# Patient Record
Sex: MALE | Race: WHITE | NOT HISPANIC OR LATINO | Employment: FULL TIME | URBAN - METROPOLITAN AREA
[De-identification: names, ages, dates, MRNs, and addresses within clinical notes are randomized per-mention and may not be internally consistent; named-entity substitution may affect disease eponyms.]

---

## 2020-11-12 ENCOUNTER — OFFICE VISIT (OUTPATIENT)
Dept: URGENT CARE | Facility: CLINIC | Age: 55
End: 2020-11-12
Payer: COMMERCIAL

## 2020-11-12 VITALS
TEMPERATURE: 97.3 F | OXYGEN SATURATION: 94 % | DIASTOLIC BLOOD PRESSURE: 78 MMHG | HEIGHT: 72 IN | SYSTOLIC BLOOD PRESSURE: 130 MMHG | HEART RATE: 86 BPM | WEIGHT: 315 LBS | BODY MASS INDEX: 42.66 KG/M2 | RESPIRATION RATE: 14 BRPM

## 2020-11-12 DIAGNOSIS — B34.9 VIRAL ILLNESS: ICD-10-CM

## 2020-11-12 DIAGNOSIS — R50.9 FEVER, UNSPECIFIED FEVER CAUSE: ICD-10-CM

## 2020-11-12 DIAGNOSIS — R05.9 COUGH: ICD-10-CM

## 2020-11-12 DIAGNOSIS — J02.9 SORE THROAT: ICD-10-CM

## 2020-11-12 DIAGNOSIS — J30.1 SEASONAL ALLERGIC RHINITIS DUE TO POLLEN: ICD-10-CM

## 2020-11-12 PROCEDURE — 99203 OFFICE O/P NEW LOW 30 MIN: CPT | Performed by: NURSE PRACTITIONER

## 2020-11-12 ASSESSMENT — ENCOUNTER SYMPTOMS
NAUSEA: 0
COUGH: 1
ABDOMINAL PAIN: 0
FEVER: 0
ORTHOPNEA: 0
HEADACHES: 0
WHEEZING: 0
SORE THROAT: 0
MYALGIAS: 0
CHILLS: 0
WEAKNESS: 0
WEIGHT LOSS: 0
VOMITING: 0
SHORTNESS OF BREATH: 0
HEARTBURN: 0
RHINORRHEA: 1

## 2020-11-12 NOTE — PROGRESS NOTES
"Subjective:   Joni Stone is a 55 y.o. male who presents for Cough (runny nose, cough x 7-8 days, fever last night )       Cough  This is a new problem. The current episode started in the past 7 days. The problem has been gradually improving. The problem occurs every few hours. The cough is non-productive. Associated symptoms include postnasal drip and rhinorrhea (clear). Pertinent negatives include no chest pain, chills, ear pain, fever, headaches, heartburn, myalgias, sore throat, shortness of breath, weight loss or wheezing. Nothing aggravates the symptoms. Risk factors for lung disease include occupational exposure. He has tried nothing for the symptoms. His past medical history is significant for environmental allergies.     Pt presents for evaluation of a new problem, reports having a \"simple head cold \"over the past 7 days.  Attributed to seasonal allergies as well as working on a new project with a lot of dust.  Last night had a fever of 102 orally per his wife.  Patient states mother is old and unsure if it works appropriately.  States that he \"feels fine\" and is here for good measure.    Review of Systems   Constitutional: Negative for chills, fever, malaise/fatigue and weight loss.   HENT: Positive for postnasal drip and rhinorrhea (clear). Negative for congestion, ear pain and sore throat.    Respiratory: Positive for cough. Negative for shortness of breath and wheezing.    Cardiovascular: Negative for chest pain, orthopnea and leg swelling.   Gastrointestinal: Negative for abdominal pain, heartburn, nausea and vomiting.   Genitourinary: Negative for dysuria.   Musculoskeletal: Negative for myalgias.   Neurological: Negative for weakness and headaches.   Endo/Heme/Allergies: Positive for environmental allergies.   All other systems reviewed and are negative.      MEDS:   Current Outpatient Medications:   •  Ibuprofen (IBU PO), Take  by mouth., Disp: , Rfl:   ALLERGIES: No Known " Allergies    Patient's PMH, SocHx, SurgHx, FamHx, Drug allergies and medications were reviewed.     Objective:   /78 (BP Location: Left arm, Patient Position: Sitting, BP Cuff Size: Large adult)   Pulse 86   Temp 36.3 °C (97.3 °F) (Temporal)   Resp 14   Ht 1.829 m (6')   Wt (!) 168.3 kg (371 lb)   SpO2 94%   BMI 50.32 kg/m²     Physical Exam  Vitals signs and nursing note reviewed.   Constitutional:       General: He is awake.      Appearance: Normal appearance. He is well-developed.   HENT:      Head: Normocephalic and atraumatic.      Right Ear: Tympanic membrane, ear canal and external ear normal.      Left Ear: Tympanic membrane, ear canal and external ear normal.      Nose: Nose normal.      Mouth/Throat:      Mouth: Mucous membranes are moist.      Pharynx: Oropharynx is clear.   Eyes:      Extraocular Movements: Extraocular movements intact.      Conjunctiva/sclera: Conjunctivae normal.      Pupils: Pupils are equal, round, and reactive to light.   Neck:      Musculoskeletal: Full passive range of motion without pain, normal range of motion and neck supple.      Thyroid: No thyromegaly.      Trachea: Trachea normal.   Cardiovascular:      Rate and Rhythm: Normal rate and regular rhythm.      Pulses: Normal pulses.      Heart sounds: Normal heart sounds, S1 normal and S2 normal.   Pulmonary:      Effort: Pulmonary effort is normal. No respiratory distress.      Breath sounds: Normal breath sounds. No wheezing, rhonchi or rales.   Abdominal:      General: Bowel sounds are normal.      Palpations: Abdomen is soft.   Musculoskeletal: Normal range of motion.   Lymphadenopathy:      Cervical: No cervical adenopathy.   Skin:     General: Skin is warm and dry.      Capillary Refill: Capillary refill takes less than 2 seconds.   Neurological:      General: No focal deficit present.      Mental Status: He is alert and oriented to person, place, and time.      Gait: Gait is intact.   Psychiatric:          Attention and Perception: Attention and perception normal.         Mood and Affect: Mood normal.         Speech: Speech normal.         Behavior: Behavior normal. Behavior is cooperative.         Thought Content: Thought content normal.         Judgment: Judgment normal.         Assessment/Plan:   Assessment    1. Viral illness    2. Seasonal allergic rhinitis due to pollen    3. Sore throat  - COVID/SARS COV-2 PCR; Future    4. Cough  - COVID/SARS COV-2 PCR; Future    5. Fever, unspecified fever cause  - COVID/SARS COV-2 PCR; Future    Reviewed today's physical exam findings.  Currently does not meet criteria for Covid testing.  Advised patient to get new thermometer to evaluate possibility of fever.  Should his symptoms continue or he have a repeat fever, then advised him to do Covid testing through the drive-through site, lab order placed in chart.  Supportive care options discussed and reviewed.  Discussed the use of OTC medications as per package directions on a PRN basis for symptomatic relief due to his history of environmental allergies to pollen as well as dust at a current worksite.  Differential diagnosis, natural history, and indications for immediate follow-up were discussed.     Return to urgent care clinic or PCP if current symptoms do not improve and/or worsening symptoms occur. Advised of signs and symptoms which would warrant further evaluation and /or emergent evaluation in ED.  All questions answered and the patient agrees to the plan of care.       Please note that this dictation was created using voice recognition software. I have made every reasonable attempt to correct obvious errors, but I expect that there may be errors of grammar and possibly content that I did not discover before finalizing the note.

## 2020-11-13 ENCOUNTER — HOSPITAL ENCOUNTER (OUTPATIENT)
Dept: LAB | Facility: MEDICAL CENTER | Age: 55
End: 2020-11-13
Attending: NURSE PRACTITIONER
Payer: COMMERCIAL

## 2020-11-13 LAB — COVID ORDER STATUS COVID19: NORMAL

## 2020-11-13 PROCEDURE — U0003 INFECTIOUS AGENT DETECTION BY NUCLEIC ACID (DNA OR RNA); SEVERE ACUTE RESPIRATORY SYNDROME CORONAVIRUS 2 (SARS-COV-2) (CORONAVIRUS DISEASE [COVID-19]), AMPLIFIED PROBE TECHNIQUE, MAKING USE OF HIGH THROUGHPUT TECHNOLOGIES AS DESCRIBED BY CMS-2020-01-R: HCPCS

## 2020-11-13 PROCEDURE — C9803 HOPD COVID-19 SPEC COLLECT: HCPCS

## 2020-11-14 LAB
SARS-COV-2 RNA RESP QL NAA+PROBE: DETECTED
SPECIMEN SOURCE: ABNORMAL

## 2021-03-15 DIAGNOSIS — Z23 NEED FOR VACCINATION: ICD-10-CM

## 2021-03-24 ENCOUNTER — IMMUNIZATION (OUTPATIENT)
Dept: FAMILY PLANNING/WOMEN'S HEALTH CLINIC | Facility: IMMUNIZATION CENTER | Age: 56
End: 2021-03-24
Attending: INTERNAL MEDICINE
Payer: COMMERCIAL

## 2021-03-24 DIAGNOSIS — Z23 ENCOUNTER FOR VACCINATION: Primary | ICD-10-CM

## 2021-03-24 DIAGNOSIS — Z23 NEED FOR VACCINATION: ICD-10-CM

## 2021-03-24 PROCEDURE — 91300 PFIZER SARS-COV-2 VACCINE: CPT

## 2021-03-24 PROCEDURE — 0001A PFIZER SARS-COV-2 VACCINE: CPT

## 2021-04-15 ENCOUNTER — IMMUNIZATION (OUTPATIENT)
Dept: FAMILY PLANNING/WOMEN'S HEALTH CLINIC | Facility: IMMUNIZATION CENTER | Age: 56
End: 2021-04-15
Attending: INTERNAL MEDICINE
Payer: COMMERCIAL

## 2021-04-15 DIAGNOSIS — Z23 ENCOUNTER FOR VACCINATION: Primary | ICD-10-CM

## 2021-04-15 PROCEDURE — 0002A PFIZER SARS-COV-2 VACCINE: CPT

## 2021-04-15 PROCEDURE — 91300 PFIZER SARS-COV-2 VACCINE: CPT

## 2021-06-18 ENCOUNTER — HOSPITAL ENCOUNTER (OUTPATIENT)
Dept: RADIOLOGY | Facility: MEDICAL CENTER | Age: 56
End: 2021-06-18
Attending: NURSE PRACTITIONER
Payer: COMMERCIAL

## 2021-06-18 ENCOUNTER — OFFICE VISIT (OUTPATIENT)
Dept: URGENT CARE | Facility: CLINIC | Age: 56
End: 2021-06-18
Payer: COMMERCIAL

## 2021-06-18 VITALS
BODY MASS INDEX: 42.66 KG/M2 | RESPIRATION RATE: 18 BRPM | HEIGHT: 72 IN | DIASTOLIC BLOOD PRESSURE: 82 MMHG | HEART RATE: 110 BPM | OXYGEN SATURATION: 95 % | TEMPERATURE: 97.6 F | SYSTOLIC BLOOD PRESSURE: 128 MMHG | WEIGHT: 315 LBS

## 2021-06-18 DIAGNOSIS — M79.89 PAIN AND SWELLING OF RIGHT LOWER LEG: ICD-10-CM

## 2021-06-18 DIAGNOSIS — S81.801A OPEN WOUND OF RIGHT LOWER LEG, INITIAL ENCOUNTER: ICD-10-CM

## 2021-06-18 DIAGNOSIS — M79.661 PAIN AND SWELLING OF RIGHT LOWER LEG: ICD-10-CM

## 2021-06-18 DIAGNOSIS — L03.115 CELLULITIS OF RIGHT LOWER EXTREMITY: ICD-10-CM

## 2021-06-18 PROCEDURE — 90715 TDAP VACCINE 7 YRS/> IM: CPT | Performed by: NURSE PRACTITIONER

## 2021-06-18 PROCEDURE — 99214 OFFICE O/P EST MOD 30 MIN: CPT | Mod: 25 | Performed by: NURSE PRACTITIONER

## 2021-06-18 PROCEDURE — 90471 IMMUNIZATION ADMIN: CPT | Performed by: NURSE PRACTITIONER

## 2021-06-18 PROCEDURE — 93971 EXTREMITY STUDY: CPT | Mod: RT

## 2021-06-18 PROCEDURE — 96372 THER/PROPH/DIAG INJ SC/IM: CPT | Performed by: NURSE PRACTITIONER

## 2021-06-18 RX ORDER — AMOXICILLIN AND CLAVULANATE POTASSIUM 875; 125 MG/1; MG/1
1 TABLET, FILM COATED ORAL 2 TIMES DAILY
Qty: 14 TABLET | Refills: 0 | Status: ON HOLD
Start: 2021-06-18 | End: 2021-06-22

## 2021-06-18 RX ORDER — DOXYCYCLINE HYCLATE 100 MG
100 TABLET ORAL 2 TIMES DAILY
Qty: 14 TABLET | Refills: 0 | Status: ON HOLD
Start: 2021-06-18 | End: 2021-06-22

## 2021-06-18 ASSESSMENT — ENCOUNTER SYMPTOMS
DIARRHEA: 1
CARDIOVASCULAR NEGATIVE: 1
COUGH: 0
SHORTNESS OF BREATH: 0
VOMITING: 0
CHILLS: 0
FEVER: 1
ABDOMINAL PAIN: 0
RESPIRATORY NEGATIVE: 1
ROS SKIN COMMENTS: RIGHT LOWER LEG WOUND
NAUSEA: 1

## 2021-06-18 ASSESSMENT — VISUAL ACUITY: OU: 1

## 2021-06-18 NOTE — PROGRESS NOTES
Subjective:     Joni Stone is a 56 y.o. male who presents for Leg Problem (leg sore on right leg x 6 months , swelling and discoloration and drainage x 4 days  ) and Nausea (nausea and fevers x 2 days )       Other  This is a new problem. The problem has been gradually worsening. Associated symptoms include a fever and nausea. Pertinent negatives include no abdominal pain, chills, coughing or vomiting.     Patient reports that 2 days ago, he started to experience some fevers and nausea.  Appetite decreased.  Had a small episode of diarrhea.    Reports that 6 months ago, he was removing an [place] tree when a part of it stabbed the side of his right lower leg.  Reports a wound there that has persisted ever since then would not fully heal.  4 days ago, he started to notice increased drainage as well as worsening surrounding redness, warmth, and mild tenderness.    Denies chest pain or shortness of breath.    Reports a history of right ankle injury with multiple operations in 2007.    Patient was screened prior to rooming and denied COVID-19 diagnosis or contact with a person who has been diagnosed or is suspected to have COVID-19. During this visit, appropriate PPE was worn, hand hygiene was performed, and the patient and any visitors were masked.     PMH:  has no past medical history of Diabetes or Hypertension.    MEDS:   Current Outpatient Medications:   •  amoxicillin-clavulanate (AUGMENTIN) 875-125 MG Tab, Take 1 tablet by mouth 2 times a day for 7 days., Disp: 14 tablet, Rfl: 0  •  doxycycline (VIBRAMYCIN) 100 MG Tab, Take 1 tablet by mouth 2 times a day for 7 days., Disp: 14 tablet, Rfl: 0  •  Ibuprofen (IBU PO), Take  by mouth., Disp: , Rfl:     ALLERGIES: No Known Allergies    SURGHX:   Past Surgical History:   Procedure Laterality Date   • OPEN REDUCTION       SOCHX:  reports that he has never smoked. His smokeless tobacco use includes snuff. He reports current alcohol use of about 3.5 oz of  alcohol per week. He reports that he does not use drugs.     FH: Reviewed with patient, not pertinent to this visit.    Review of Systems   Constitutional: Positive for fever. Negative for chills and malaise/fatigue.   Respiratory: Negative.  Negative for cough and shortness of breath.    Cardiovascular: Negative.    Gastrointestinal: Positive for diarrhea and nausea. Negative for abdominal pain and vomiting.   Musculoskeletal:        Redness, swelling, warmth, mild tenderness of right lower extremity   Skin:        Right lower leg wound   All other systems reviewed and are negative.    Additional details per HPI.      Objective:     /82 (BP Location: Left arm, Patient Position: Sitting, BP Cuff Size: Large adult)   Pulse (!) 110   Temp 36.4 °C (97.6 °F) (Temporal)   Resp 18   Ht 1.829 m (6')   Wt (!) 163 kg (359 lb)   SpO2 95%   BMI 48.69 kg/m²     Physical Exam  Vitals reviewed.   Constitutional:       General: He is not in acute distress.     Appearance: He is well-developed. He is not ill-appearing or toxic-appearing.   HENT:      Head: Normocephalic.      Right Ear: External ear normal.      Left Ear: External ear normal.   Eyes:      General: Vision grossly intact.      Extraocular Movements: Extraocular movements intact.      Conjunctiva/sclera: Conjunctivae normal.   Cardiovascular:      Rate and Rhythm: Tachycardia present.      Pulses: Normal pulses.   Pulmonary:      Effort: Pulmonary effort is normal. No respiratory distress.   Musculoskeletal:         General: No deformity. Normal range of motion.      Cervical back: Normal range of motion.      Right lower leg: Swelling present. No deformity or tenderness. 1+ Pitting Edema present.      Right foot: Normal capillary refill.        Legs:    Skin:     General: Skin is warm and dry.      Capillary Refill: Capillary refill takes less than 2 seconds.      Coloration: Skin is not pale.      Findings: Erythema present.   Neurological:      Mental  Status: He is alert and oriented to person, place, and time.      Sensory: No sensory deficit.      Motor: No weakness.      Coordination: Coordination normal.      Gait: Gait normal.   Psychiatric:         Behavior: Behavior normal. Behavior is cooperative.       Assessment/Plan:     1. Pain and swelling of right lower leg  - US-EXTREMITY VENOUS LOWER UNILAT RIGHT; Future  - REFERRAL TO WOUND CLINIC    2. Cellulitis of right lower extremity  - amoxicillin-clavulanate (AUGMENTIN) 875-125 MG Tab; Take 1 tablet by mouth 2 times a day for 7 days.  Dispense: 14 tablet; Refill: 0  - doxycycline (VIBRAMYCIN) 100 MG Tab; Take 1 tablet by mouth 2 times a day for 7 days.  Dispense: 14 tablet; Refill: 0  - cefTRIAXone (ROCEPHIN) 1 g, lidocaine (XYLOCAINE) 1 % 3.6 mL for IM use  - REFERRAL TO WOUND CLINIC    3. Open wound of right lower leg, initial encounter  - Tdap =>6yo IM  - REFERRAL TO WOUND CLINIC    Devitalized tissue/slough debrided from ulceration. Irrigated copiously with NS. Patient tolerated well.    Rocephin IM given in clinic.  Patient tolerated well.  No complications.    Ultrasound pending to further evaluate symptoms and rule out DVT.    Rx as above sent electronically to pharmacy.    Referral placed for wound clinic follow-up.    , otherwise vital signs stable, asymptomatic, no acute distress at this time. Warning signs reviewed. Return precautions discussed.     Differential diagnosis, natural history, supportive care, over-the-counter symptom management per 's instructions, close monitoring, and indications for immediate follow-up discussed.     Patient advised to: Return for 1) Symptoms that worsen/don't improve, or go to ER, 2) Follow up with primary care in 7-10 days.    All questions answered. Patient agrees with the plan of care.    Billing note: 30 minutes was allotted and spent for patient care and coordination of care (not reported separately) including preparing for the visit,  obtaining/reviewing history, performing an exam/evaluation, developing a plan of care, counseling/educating the patient, reviewing unique test results/external notes from unique sources, and documentation. Additional time was spent on wound care. Care specific to this encounter was summarized here. Please refer to the chart for additional details on the care provided.

## 2021-06-20 ENCOUNTER — HOSPITAL ENCOUNTER (INPATIENT)
Facility: MEDICAL CENTER | Age: 56
LOS: 2 days | DRG: 872 | End: 2021-06-23
Attending: EMERGENCY MEDICINE | Admitting: INTERNAL MEDICINE
Payer: COMMERCIAL

## 2021-06-20 DIAGNOSIS — E66.09 OBESITY DUE TO EXCESS CALORIES WITH SERIOUS COMORBIDITY, UNSPECIFIED CLASSIFICATION: ICD-10-CM

## 2021-06-20 DIAGNOSIS — L03.115 CELLULITIS OF RIGHT LOWER EXTREMITY: ICD-10-CM

## 2021-06-20 DIAGNOSIS — A41.9 SEPSIS WITHOUT ACUTE ORGAN DYSFUNCTION, DUE TO UNSPECIFIED ORGANISM (HCC): ICD-10-CM

## 2021-06-20 LAB
ALBUMIN SERPL BCP-MCNC: 3.6 G/DL (ref 3.2–4.9)
ALBUMIN/GLOB SERPL: 0.9 G/DL
ALP SERPL-CCNC: 48 U/L (ref 30–99)
ALT SERPL-CCNC: 21 U/L (ref 2–50)
ANION GAP SERPL CALC-SCNC: 12 MMOL/L (ref 7–16)
APPEARANCE UR: CLEAR
AST SERPL-CCNC: 12 U/L (ref 12–45)
BASOPHILS # BLD AUTO: 0.5 % (ref 0–1.8)
BASOPHILS # BLD: 0.09 K/UL (ref 0–0.12)
BILIRUB SERPL-MCNC: 0.6 MG/DL (ref 0.1–1.5)
BILIRUB UR QL STRIP.AUTO: NEGATIVE
BUN SERPL-MCNC: 11 MG/DL (ref 8–22)
CALCIUM SERPL-MCNC: 9.1 MG/DL (ref 8.4–10.2)
CHLORIDE SERPL-SCNC: 101 MMOL/L (ref 96–112)
CO2 SERPL-SCNC: 23 MMOL/L (ref 20–33)
COLOR UR: YELLOW
CREAT SERPL-MCNC: 0.77 MG/DL (ref 0.5–1.4)
EOSINOPHIL # BLD AUTO: 0.23 K/UL (ref 0–0.51)
EOSINOPHIL NFR BLD: 1.4 % (ref 0–6.9)
ERYTHROCYTE [DISTWIDTH] IN BLOOD BY AUTOMATED COUNT: 45.7 FL (ref 35.9–50)
GLOBULIN SER CALC-MCNC: 4.1 G/DL (ref 1.9–3.5)
GLUCOSE SERPL-MCNC: 117 MG/DL (ref 65–99)
GLUCOSE UR STRIP.AUTO-MCNC: NEGATIVE MG/DL
HCT VFR BLD AUTO: 46.5 % (ref 42–52)
HGB BLD-MCNC: 15.3 G/DL (ref 14–18)
IMM GRANULOCYTES # BLD AUTO: 0.13 K/UL (ref 0–0.11)
IMM GRANULOCYTES NFR BLD AUTO: 0.8 % (ref 0–0.9)
KETONES UR STRIP.AUTO-MCNC: NEGATIVE MG/DL
LACTATE BLD-SCNC: 1.1 MMOL/L (ref 0.5–2)
LACTATE BLD-SCNC: 1.5 MMOL/L (ref 0.5–2)
LACTATE BLD-SCNC: 1.7 MMOL/L (ref 0.5–2)
LACTATE BLD-SCNC: 2 MMOL/L (ref 0.5–2)
LEUKOCYTE ESTERASE UR QL STRIP.AUTO: NEGATIVE
LYMPHOCYTES # BLD AUTO: 2.34 K/UL (ref 1–4.8)
LYMPHOCYTES NFR BLD: 14.3 % (ref 22–41)
MCH RBC QN AUTO: 30.7 PG (ref 27–33)
MCHC RBC AUTO-ENTMCNC: 32.9 G/DL (ref 33.7–35.3)
MCV RBC AUTO: 93.4 FL (ref 81.4–97.8)
MICRO URNS: NORMAL
MONOCYTES # BLD AUTO: 1.33 K/UL (ref 0–0.85)
MONOCYTES NFR BLD AUTO: 8.1 % (ref 0–13.4)
NEUTROPHILS # BLD AUTO: 12.3 K/UL (ref 1.82–7.42)
NEUTROPHILS NFR BLD: 74.9 % (ref 44–72)
NITRITE UR QL STRIP.AUTO: NEGATIVE
NRBC # BLD AUTO: 0 K/UL
NRBC BLD-RTO: 0 /100 WBC
PH UR STRIP.AUTO: 5 [PH] (ref 5–8)
PLATELET # BLD AUTO: 205 K/UL (ref 164–446)
PMV BLD AUTO: 9.8 FL (ref 9–12.9)
POTASSIUM SERPL-SCNC: 3.8 MMOL/L (ref 3.6–5.5)
PROT SERPL-MCNC: 7.7 G/DL (ref 6–8.2)
PROT UR QL STRIP: NEGATIVE MG/DL
RBC # BLD AUTO: 4.98 M/UL (ref 4.7–6.1)
RBC UR QL AUTO: NEGATIVE
SARS-COV-2 RNA RESP QL NAA+PROBE: NOTDETECTED
SODIUM SERPL-SCNC: 136 MMOL/L (ref 135–145)
SP GR UR STRIP.AUTO: 1.02
SPECIMEN SOURCE: NORMAL
WBC # BLD AUTO: 16.4 K/UL (ref 4.8–10.8)

## 2021-06-20 PROCEDURE — G0378 HOSPITAL OBSERVATION PER HR: HCPCS

## 2021-06-20 PROCEDURE — 83605 ASSAY OF LACTIC ACID: CPT | Mod: 91

## 2021-06-20 PROCEDURE — 87186 SC STD MICRODIL/AGAR DIL: CPT

## 2021-06-20 PROCEDURE — 81003 URINALYSIS AUTO W/O SCOPE: CPT

## 2021-06-20 PROCEDURE — 99220 PR INITIAL OBSERVATION CARE,LEVL III: CPT | Performed by: INTERNAL MEDICINE

## 2021-06-20 PROCEDURE — 700105 HCHG RX REV CODE 258: Performed by: INTERNAL MEDICINE

## 2021-06-20 PROCEDURE — 700102 HCHG RX REV CODE 250 W/ 637 OVERRIDE(OP): Performed by: INTERNAL MEDICINE

## 2021-06-20 PROCEDURE — U0003 INFECTIOUS AGENT DETECTION BY NUCLEIC ACID (DNA OR RNA); SEVERE ACUTE RESPIRATORY SYNDROME CORONAVIRUS 2 (SARS-COV-2) (CORONAVIRUS DISEASE [COVID-19]), AMPLIFIED PROBE TECHNIQUE, MAKING USE OF HIGH THROUGHPUT TECHNOLOGIES AS DESCRIBED BY CMS-2020-01-R: HCPCS

## 2021-06-20 PROCEDURE — 99285 EMERGENCY DEPT VISIT HI MDM: CPT

## 2021-06-20 PROCEDURE — 96372 THER/PROPH/DIAG INJ SC/IM: CPT

## 2021-06-20 PROCEDURE — 87040 BLOOD CULTURE FOR BACTERIA: CPT | Mod: 91

## 2021-06-20 PROCEDURE — 700105 HCHG RX REV CODE 258: Performed by: EMERGENCY MEDICINE

## 2021-06-20 PROCEDURE — 36415 COLL VENOUS BLD VENIPUNCTURE: CPT

## 2021-06-20 PROCEDURE — 87077 CULTURE AEROBIC IDENTIFY: CPT | Mod: 91

## 2021-06-20 PROCEDURE — 87205 SMEAR GRAM STAIN: CPT

## 2021-06-20 PROCEDURE — 80053 COMPREHEN METABOLIC PANEL: CPT

## 2021-06-20 PROCEDURE — 87147 CULTURE TYPE IMMUNOLOGIC: CPT

## 2021-06-20 PROCEDURE — 96367 TX/PROPH/DG ADDL SEQ IV INF: CPT

## 2021-06-20 PROCEDURE — 700111 HCHG RX REV CODE 636 W/ 250 OVERRIDE (IP): Performed by: INTERNAL MEDICINE

## 2021-06-20 PROCEDURE — 87070 CULTURE OTHR SPECIMN AEROBIC: CPT

## 2021-06-20 PROCEDURE — U0005 INFEC AGEN DETEC AMPLI PROBE: HCPCS

## 2021-06-20 PROCEDURE — 700111 HCHG RX REV CODE 636 W/ 250 OVERRIDE (IP): Performed by: EMERGENCY MEDICINE

## 2021-06-20 PROCEDURE — 96365 THER/PROPH/DIAG IV INF INIT: CPT

## 2021-06-20 PROCEDURE — 96366 THER/PROPH/DIAG IV INF ADDON: CPT

## 2021-06-20 PROCEDURE — A9270 NON-COVERED ITEM OR SERVICE: HCPCS | Performed by: INTERNAL MEDICINE

## 2021-06-20 PROCEDURE — 85025 COMPLETE CBC W/AUTO DIFF WBC: CPT

## 2021-06-20 RX ORDER — ONDANSETRON 4 MG/1
4 TABLET, ORALLY DISINTEGRATING ORAL EVERY 4 HOURS PRN
Status: DISCONTINUED | OUTPATIENT
Start: 2021-06-20 | End: 2021-06-23 | Stop reason: HOSPADM

## 2021-06-20 RX ORDER — PROCHLORPERAZINE EDISYLATE 5 MG/ML
5-10 INJECTION INTRAMUSCULAR; INTRAVENOUS EVERY 4 HOURS PRN
Status: DISCONTINUED | OUTPATIENT
Start: 2021-06-20 | End: 2021-06-23 | Stop reason: HOSPADM

## 2021-06-20 RX ORDER — PROMETHAZINE HYDROCHLORIDE 25 MG/1
12.5-25 SUPPOSITORY RECTAL EVERY 4 HOURS PRN
Status: DISCONTINUED | OUTPATIENT
Start: 2021-06-20 | End: 2021-06-23 | Stop reason: HOSPADM

## 2021-06-20 RX ORDER — ACETAMINOPHEN 325 MG/1
650 TABLET ORAL EVERY 6 HOURS PRN
Status: DISCONTINUED | OUTPATIENT
Start: 2021-06-20 | End: 2021-06-23 | Stop reason: HOSPADM

## 2021-06-20 RX ORDER — SODIUM CHLORIDE, SODIUM LACTATE, POTASSIUM CHLORIDE, AND CALCIUM CHLORIDE .6; .31; .03; .02 G/100ML; G/100ML; G/100ML; G/100ML
1000 INJECTION, SOLUTION INTRAVENOUS
Status: DISCONTINUED | OUTPATIENT
Start: 2021-06-20 | End: 2021-06-23 | Stop reason: HOSPADM

## 2021-06-20 RX ORDER — NAPROXEN SODIUM 220 MG
220 TABLET ORAL DAILY
COMMUNITY

## 2021-06-20 RX ORDER — ONDANSETRON 2 MG/ML
4 INJECTION INTRAMUSCULAR; INTRAVENOUS EVERY 4 HOURS PRN
Status: DISCONTINUED | OUTPATIENT
Start: 2021-06-20 | End: 2021-06-23 | Stop reason: HOSPADM

## 2021-06-20 RX ORDER — SODIUM CHLORIDE, SODIUM LACTATE, POTASSIUM CHLORIDE, AND CALCIUM CHLORIDE .6; .31; .03; .02 G/100ML; G/100ML; G/100ML; G/100ML
1000 INJECTION, SOLUTION INTRAVENOUS
Status: COMPLETED | OUTPATIENT
Start: 2021-06-20 | End: 2021-06-20

## 2021-06-20 RX ORDER — ACETAMINOPHEN 500 MG
TABLET ORAL 2 TIMES DAILY PRN
Status: ON HOLD | COMMUNITY
End: 2021-06-22

## 2021-06-20 RX ORDER — GLUCOSAMINE SULFATE 500 MG
500 CAPSULE ORAL DAILY
COMMUNITY
End: 2023-10-02

## 2021-06-20 RX ORDER — PROMETHAZINE HYDROCHLORIDE 25 MG/1
12.5-25 TABLET ORAL EVERY 4 HOURS PRN
Status: DISCONTINUED | OUTPATIENT
Start: 2021-06-20 | End: 2021-06-23 | Stop reason: HOSPADM

## 2021-06-20 RX ORDER — AMPICILLIN AND SULBACTAM 2; 1 G/1; G/1
INJECTION, POWDER, FOR SOLUTION INTRAMUSCULAR; INTRAVENOUS
Status: COMPLETED
Start: 2021-06-20 | End: 2021-06-20

## 2021-06-20 RX ADMIN — AMPICILLIN SODIUM AND SULBACTAM SODIUM 3 G: 2; 1 INJECTION, POWDER, FOR SOLUTION INTRAMUSCULAR; INTRAVENOUS at 18:29

## 2021-06-20 RX ADMIN — ENOXAPARIN SODIUM 40 MG: 40 INJECTION SUBCUTANEOUS at 13:24

## 2021-06-20 RX ADMIN — VANCOMYCIN HYDROCHLORIDE 3 G: 500 INJECTION, POWDER, LYOPHILIZED, FOR SOLUTION INTRAVENOUS at 14:00

## 2021-06-20 RX ADMIN — ACETAMINOPHEN 650 MG: 325 TABLET, FILM COATED ORAL at 22:48

## 2021-06-20 RX ADMIN — SODIUM CHLORIDE 3 G: 900 INJECTION INTRAVENOUS at 10:22

## 2021-06-20 RX ADMIN — AMPICILLIN SODIUM AND SULBACTAM SODIUM 3 G: 2; 1 INJECTION, POWDER, FOR SOLUTION INTRAMUSCULAR; INTRAVENOUS at 23:54

## 2021-06-20 RX ADMIN — SODIUM CHLORIDE, POTASSIUM CHLORIDE, SODIUM LACTATE AND CALCIUM CHLORIDE 1000 ML: 600; 310; 30; 20 INJECTION, SOLUTION INTRAVENOUS at 10:23

## 2021-06-20 ASSESSMENT — COGNITIVE AND FUNCTIONAL STATUS - GENERAL
CLIMB 3 TO 5 STEPS WITH RAILING: A LITTLE
MOBILITY SCORE: 23
SUGGESTED CMS G CODE MODIFIER MOBILITY: CI
SUGGESTED CMS G CODE MODIFIER DAILY ACTIVITY: CH
DAILY ACTIVITIY SCORE: 24

## 2021-06-20 ASSESSMENT — LIFESTYLE VARIABLES
HAVE PEOPLE ANNOYED YOU BY CRITICIZING YOUR DRINKING: NO
HAVE YOU EVER FELT YOU SHOULD CUT DOWN ON YOUR DRINKING: NO
ON A TYPICAL DAY WHEN YOU DRINK ALCOHOL HOW MANY DRINKS DO YOU HAVE: 7
TOTAL SCORE: 0
EVER FELT BAD OR GUILTY ABOUT YOUR DRINKING: NO
CONSUMPTION TOTAL: NEGATIVE
EVER HAD A DRINK FIRST THING IN THE MORNING TO STEADY YOUR NERVES TO GET RID OF A HANGOVER: NO
TOTAL SCORE: 0
ALCOHOL_USE: YES
AVERAGE NUMBER OF DAYS PER WEEK YOU HAVE A DRINK CONTAINING ALCOHOL: 1
HOW MANY TIMES IN THE PAST YEAR HAVE YOU HAD 5 OR MORE DRINKS IN A DAY: 0
TOTAL SCORE: 0

## 2021-06-20 ASSESSMENT — ENCOUNTER SYMPTOMS
VOMITING: 1
SHORTNESS OF BREATH: 0
MYALGIAS: 0
DIARRHEA: 1
ABDOMINAL PAIN: 0
COUGH: 0
NAUSEA: 1
CHILLS: 1
WEAKNESS: 1
DIZZINESS: 0
FEVER: 1

## 2021-06-20 ASSESSMENT — PAIN DESCRIPTION - PAIN TYPE
TYPE: ACUTE PAIN
TYPE: ACUTE PAIN;CHRONIC PAIN
TYPE: ACUTE PAIN
TYPE: ACUTE PAIN;CHRONIC PAIN

## 2021-06-20 NOTE — ED TRIAGE NOTES
Pt was seen at [facility] 2 days ago, and treated for a wound recurring on his right lower leg for the past 6 months, and worsening since this Thursday.   Initially, he was removing an evergreen tree when a branch stabbed the side of his extremity causing escalation of cellulitis.    He presents today for reevaluation, and denies worsening of his condition since his last medical evaluation.  Chief Complaint   Patient presents with   • Wound Infection   • Leg Pain     /93   Pulse (!) 110   Temp 36.1 °C (97 °F) (Temporal)   Resp 20   Ht 1.829 m (6')   Wt (!) 163 kg (359 lb 5.6 oz)   SpO2 94%   BMI 48.74 kg/m²

## 2021-06-20 NOTE — ASSESSMENT & PLAN NOTE
This is Sepsis Present on admission  SIRS criteria identified on my evaluation include: Tachycardia, with heart rate greater than 90 BPM and Leukocytosis, with WBC greater than 12,000  Source is skin  Sepsis protocol initiated  Fluid resuscitation ordered per protocol  IV antibiotics as appropriate for source of sepsis  While organ dysfunction may be noted elsewhere in this problem list or in the chart, degree of organ dysfunction does not meet CMS criteria for severe sepsis  Improving

## 2021-06-20 NOTE — PROGRESS NOTES
"Pharmacy Vancomycin Kinetics Note for 6/20/2021     56 y.o. male on Vancomycin day # 1     Vancomycin Indication (AUC Dosing): Skin/skin structure infection    Provider specified end date: 06/25/21    Active Antibiotics (From admission, onward)    Ordered     Ordering Provider       Sun Jun 20, 2021  1:10 PM    06/20/21 1310  vancomycin (VANCOCIN) 1,750 mg in  mL IVPB  (vancomycin (VANCOCIN) IV (LD + Maintenance))  EVERY 12 HOURS      VAN Jordan Jun 20, 2021  1:00 PM    06/20/21 1300  vancomycin (VANCOCIN) 3 g in  mL IVPB  (vancomycin (VANCOCIN) IV (LD + Maintenance))  ONCE      VAN Jordan Jun 20, 2021 12:49 PM    06/20/21 1249  ampicillin/sulbactam (UNASYN) 3 g in  mL IVPB  EVERY 6 HOURS      Tameka Roth M.D.    06/20/21 1249  MD Alert...Vancomycin per Pharmacy  PHARMACY TO DOSE     Question:  Indication(s) for vancomycin?  Answer:  Skin and soft tissue infection    Tameka Roth M.D.          Dosing Weight: 163 kg (359 lb 5.6 oz)      Admission History: Admitted on 6/20/2021 for Cellulitis [L03.90]  Pertinent history: Patient with non-healing (6 months) puncture wound at site of prior ankle fracture.  Patient does own wound care and reports worsening leading to being seen and prescribed augmentin and doxycycline 2 days ago.    Allergies: Patient has no known allergies.     Pertinent cultures to date:     Results     Procedure Component Value Units Date/Time    Urinalysis [[id number]]     Order Status: No result Specimen: Urine     CULTURE WOUND W/ GRAM STAIN [[id number]]     Order Status: No result Specimen: Wound from Right Leg     BLOOD CULTURE [[id number]] Collected: 06/20/21 1149    Order Status: Completed Specimen: Blood from Peripheral Updated: 06/20/21 1152    Narrative:      Per Hospital Policy: Only change Specimen Src: to \"Line\" if  specified by physician order.    SARS-CoV-2 PCR (24 hour In-House): Collect NP swab in VTM [[id number]] Collected: 06/20/21 1100 " "   Order Status: Completed Specimen: Respirate Updated: 21 1104     SARS-CoV-2 Source NP Swab    Narrative:      Have you been in close contact with a person who is suspected  or known to be positive for COVID-19 within the last 30 days  (e.g. last seen that person < 30 days ago)->No    BLOOD CULTURE [[id number]] Collected: 21 1100    Order Status: Completed Specimen: Blood from Peripheral Updated: 21 1103    Narrative:      Per Hospital Policy: Only change Specimen Src: to \"Line\" if  specified by physician order.          Labs:   Estimated Creatinine Clearance: 167.7 mL/min (by C-G formula based on SCr of 0.77 mg/dL).  Recent Labs     21  1004   WBC 16.4*   NEUTSPOLYS 74.90*     Recent Labs     21  1004   BUN 11   CREATININE 0.77   ALBUMIN 3.6     No intake or output data in the 24 hours ending 21 1310   /80   Pulse 86   Temp 36.1 °C (97 °F) (Temporal)   Resp 13   Ht 1.829 m (6')   Wt (!) 163 kg (359 lb 5.6 oz)   SpO2 93%  Temp (24hrs), Av.1 °C (97 °F), Min:36.1 °C (97 °F), Max:36.1 °C (97 °F)      List concerns for Vancomycin clearance: Obesity    Pharmacokinetics:  AUC kinetics:   Ke (hr ^-1): 0.143 hr^-1  Half life: 4.85 hr  Clearance: 6.578  Estimated TDD: 3289  Estimated Dose: 946  Estimated interval: 6.9    A/P:   -  Vancomycin dose: 1750mg q12h at 8087-3232    -  Next vancomycin level(s):    -  @ 1930   - Vt @ 0430     -  Predicted vancomycin AUC from initial AUC test calculator: 532 mg·hr/L    -  Comments: Monitor renal function and adjust dose or interval if needed.  De-escalate based on cultures if possible    Norman Billingsley, PharmD, BCPS  "

## 2021-06-20 NOTE — ASSESSMENT & PLAN NOTE
Failed outpatient antibiotic therapy with doxycycline, was prescribed amoxicillin, did not take.  Continue unasyn and vanc given purulent drainage  Wound cultures show MRSA and polymicrobial, given failed outpatient antibiotic therapy with doxycycline, will start linezolid and continue with amoxicillin to complete a 5-day course of treatment.  Elevate leg, was also given lasix x1 today for edema  Outpatient wound care

## 2021-06-20 NOTE — PROGRESS NOTES
Pt arrived via wheelchair, admitted to room 206 from ED at 1230. Pt is A&Ox4, c/o pain reported at 2/10 on a scale of 0-10. Patient right leg is red and swollen. Patient in the recliner elevating his leg. Oriented to room call light. Reviewed plan of care with the patient. Call light within reach. Encouraged pt the importance to call for assistance. Hourly rounding in progress

## 2021-06-20 NOTE — H&P
Hospital Medicine History & Physical Note    Date of Service  6/20/2021    Primary Care Physician  Reginald East M.D.    Consultants  none    Code Status  Full    Chief Complaint  Chief Complaint   Patient presents with   • Wound Infection   • Leg Pain       History of Presenting Illness  56 y.o. male who presented 6/20/2021 with prior right ankle fracture s/p surgery who had puncture wound 6 months ago. He's has been taking care of his wound on his own, his wife also has chronic wounds that he cares for. For the past several days he's noticed increased edema, erythema, and purulent drainage from the wound. On 5 days ago he had F/C, headache and 2 episodes of diarrhea. He went to clinic 2 days ago and prescribed oral antibiotics which he started but he didn't feel well and came to the ED. He did have doppler that was neg for DVT    Review of Systems  Review of Systems   Constitutional: Positive for chills, fever and malaise/fatigue.   Respiratory: Negative for cough and shortness of breath.    Cardiovascular: Positive for leg swelling. Negative for chest pain.   Gastrointestinal: Positive for diarrhea, nausea and vomiting. Negative for abdominal pain.   Genitourinary: Negative for dysuria.   Musculoskeletal: Negative for myalgias.   Skin: Positive for rash.   Neurological: Positive for weakness. Negative for dizziness.   All other systems reviewed and are negative.      Past Medical History  Obesity    Surgical History   has a past surgical history that includes open reduction. of right ankle    Family History  family history includes Cancer in his father; Hypertension in his mother. Mom with colon cancer    Social History   reports that he has never smoked. He has quit using smokeless tobacco.  His smokeless tobacco use included snuff. He reports current alcohol use of about 3.5 oz of alcohol per week. He reports that he does not use drugs.    Allergies  No Known Allergies    Medications  Prior to Admission  Medications   Prescriptions Last Dose Informant Patient Reported? Taking?   acetaminophen (TYLENOL) 500 MG Tab 6/20/2021 at 0000 Patient Yes Yes   Sig: Take 1,000-1,500 mg by mouth 2 times a day as needed for Moderate Pain.   amoxicillin-clavulanate (AUGMENTIN) 875-125 MG Tab 6/20/2021 at 0830 Patient No No   Sig: Take 1 tablet by mouth 2 times a day for 7 days.   Patient taking differently: Take 1 tablet by mouth 2 times a day. Started: 6/18, scheduled to complete: 6/24/21   doxycycline (VIBRAMYCIN) 100 MG Tab 6/20/2021 at 0830 Patient No No   Sig: Take 1 tablet by mouth 2 times a day for 7 days.   Patient taking differently: Take 100 mg by mouth 2 times a day. Started: 6/18, scheduled to complete: 6/24/21   glucosamine 500 MG Cap 6/17/2021 at unknown Patient Yes Yes   Sig: Take 500 mg by mouth every day.   naproxen (ALEVE) 220 MG tablet 6/15/2021 at unknown Patient Yes Yes   Sig: Take 220 mg by mouth 2 times daily with meals as needed (inflammation).      Facility-Administered Medications: None       Physical Exam  Temp:  [36.1 °C (97 °F)] 36.1 °C (97 °F)  Pulse:  [] 98  Resp:  [19-20] 19  BP: (153-160)/(76-93) 153/76  SpO2:  [91 %-94 %] 93 %    Physical Exam  Vitals and nursing note reviewed.   Constitutional:       General: He is not in acute distress.     Appearance: He is not toxic-appearing or diaphoretic.   HENT:      Head: Normocephalic.      Mouth/Throat:      Mouth: Mucous membranes are moist.   Eyes:      General:         Right eye: No discharge.         Left eye: No discharge.   Cardiovascular:      Rate and Rhythm: Normal rate and regular rhythm.      Pulses: Normal pulses.   Pulmonary:      Effort: Pulmonary effort is normal. No respiratory distress.      Breath sounds: No wheezing or rales.   Abdominal:      Palpations: Abdomen is soft.      Tenderness: There is no abdominal tenderness. There is no guarding or rebound.   Musculoskeletal:      Cervical back: Neck supple.      Right lower leg:  Edema (2+) present.      Left lower leg: Edema (trace) present.      Comments: Right lateral lower leg with shallow ulceration and clear drainage, erythema and increased warmth from knee to ankle, nontender   Skin:     General: Skin is warm and dry.      Capillary Refill: Capillary refill takes less than 2 seconds.   Neurological:      Mental Status: He is alert.      Comments: AOx4   Psychiatric:         Mood and Affect: Mood normal.         Behavior: Behavior normal.         Laboratory:  Recent Labs     06/20/21  1004   WBC 16.4*   RBC 4.98   HEMOGLOBIN 15.3   HEMATOCRIT 46.5   MCV 93.4   MCH 30.7   MCHC 32.9*   RDW 45.7   PLATELETCT 205   MPV 9.8     Recent Labs     06/20/21  1004   SODIUM 136   POTASSIUM 3.8   CHLORIDE 101   CO2 23   GLUCOSE 117*   BUN 11   CREATININE 0.77   CALCIUM 9.1     Recent Labs     06/20/21  1004   ALTSGPT 21   ASTSGOT 12   ALKPHOSPHAT 48   TBILIRUBIN 0.6   GLUCOSE 117*         No results for input(s): NTPROBNP in the last 72 hours.      No results for input(s): TROPONINT in the last 72 hours.    Imaging:  No orders to display         Assessment/Plan:  I anticipate this patient is appropriate for observation status at this time.    * Cellulitis of right lower extremity  Assessment & Plan  Start on unasyn and vanc given purulent drainage  Wound culture ordered  Elevate leg, consider diuresis for wound healing if he stabilizes    Sepsis (HCC)  Assessment & Plan  This is Sepsis Present on admission  SIRS criteria identified on my evaluation include: Tachycardia, with heart rate greater than 90 BPM and Leukocytosis, with WBC greater than 12,000  Source is skin  Sepsis protocol initiated  Fluid resuscitation ordered per protocol  IV antibiotics as appropriate for source of sepsis  While organ dysfunction may be noted elsewhere in this problem list or in the chart, degree of organ dysfunction does not meet CMS criteria for severe sepsis  Blood cultures collected

## 2021-06-20 NOTE — ED NOTES
Med Rec completed: per pt at bedside.     Pt is on 7 day course of Augmentin 875mg twice daily and Doxycycline 100mg twice daily. Scheduled to complete: 6/24/21.    Preferred Pharmacy: [facility] P: [phone]    Pt confirmed following allergies:  No Known Allergies     Pt's home medications:   Medication Sig   • acetaminophen (TYLENOL) 500 MG Tab Take 1,000-1,500 mg by mouth 2 times a day as needed for Moderate Pain.   • naproxen (ALEVE) 220 MG tablet Take 220 mg by mouth 2 times daily with meals as needed (inflammation).   • glucosamine 500 MG Cap Take 500 mg by mouth every day.   • amoxicillin-clavulanate (AUGMENTIN) 875-125 MG Tab Take 1 tablet by mouth 2 times a day for 7 days. (Patient taking differently: Take 1 tablet by mouth 2 times a day. Started: 6/18, scheduled to complete: 6/24/21)   • doxycycline (VIBRAMYCIN) 100 MG Tab Take 1 tablet by mouth 2 times a day for 7 days. (Patient taking differently: Take 100 mg by mouth 2 times a day. Started: 6/18, scheduled to complete: 6/24/21)

## 2021-06-21 LAB
ANION GAP SERPL CALC-SCNC: 8 MMOL/L (ref 7–16)
BASOPHILS # BLD AUTO: 0.5 % (ref 0–1.8)
BASOPHILS # BLD: 0.06 K/UL (ref 0–0.12)
BUN SERPL-MCNC: 11 MG/DL (ref 8–22)
CALCIUM SERPL-MCNC: 8.7 MG/DL (ref 8.4–10.2)
CHLORIDE SERPL-SCNC: 104 MMOL/L (ref 96–112)
CO2 SERPL-SCNC: 27 MMOL/L (ref 20–33)
CREAT SERPL-MCNC: 0.67 MG/DL (ref 0.5–1.4)
EOSINOPHIL # BLD AUTO: 0.4 K/UL (ref 0–0.51)
EOSINOPHIL NFR BLD: 3.4 % (ref 0–6.9)
ERYTHROCYTE [DISTWIDTH] IN BLOOD BY AUTOMATED COUNT: 46.1 FL (ref 35.9–50)
GLUCOSE SERPL-MCNC: 102 MG/DL (ref 65–99)
GRAM STN SPEC: NORMAL
HCT VFR BLD AUTO: 42.6 % (ref 42–52)
HGB BLD-MCNC: 13.8 G/DL (ref 14–18)
IMM GRANULOCYTES # BLD AUTO: 0.13 K/UL (ref 0–0.11)
IMM GRANULOCYTES NFR BLD AUTO: 1.1 % (ref 0–0.9)
LYMPHOCYTES # BLD AUTO: 2.19 K/UL (ref 1–4.8)
LYMPHOCYTES NFR BLD: 18.5 % (ref 22–41)
MCH RBC QN AUTO: 30.2 PG (ref 27–33)
MCHC RBC AUTO-ENTMCNC: 32.4 G/DL (ref 33.7–35.3)
MCV RBC AUTO: 93.2 FL (ref 81.4–97.8)
MONOCYTES # BLD AUTO: 0.79 K/UL (ref 0–0.85)
MONOCYTES NFR BLD AUTO: 6.7 % (ref 0–13.4)
NEUTROPHILS # BLD AUTO: 8.28 K/UL (ref 1.82–7.42)
NEUTROPHILS NFR BLD: 69.8 % (ref 44–72)
NRBC # BLD AUTO: 0 K/UL
NRBC BLD-RTO: 0 /100 WBC
PLATELET # BLD AUTO: 227 K/UL (ref 164–446)
PMV BLD AUTO: 9.7 FL (ref 9–12.9)
POTASSIUM SERPL-SCNC: 4 MMOL/L (ref 3.6–5.5)
RBC # BLD AUTO: 4.57 M/UL (ref 4.7–6.1)
SIGNIFICANT IND 70042: NORMAL
SITE SITE: NORMAL
SODIUM SERPL-SCNC: 139 MMOL/L (ref 135–145)
SOURCE SOURCE: NORMAL
WBC # BLD AUTO: 11.9 K/UL (ref 4.8–10.8)

## 2021-06-21 PROCEDURE — 80048 BASIC METABOLIC PNL TOTAL CA: CPT

## 2021-06-21 PROCEDURE — 96366 THER/PROPH/DIAG IV INF ADDON: CPT

## 2021-06-21 PROCEDURE — 87641 MR-STAPH DNA AMP PROBE: CPT

## 2021-06-21 PROCEDURE — 96372 THER/PROPH/DIAG INJ SC/IM: CPT

## 2021-06-21 PROCEDURE — 85025 COMPLETE CBC W/AUTO DIFF WBC: CPT

## 2021-06-21 PROCEDURE — A9270 NON-COVERED ITEM OR SERVICE: HCPCS | Performed by: INTERNAL MEDICINE

## 2021-06-21 PROCEDURE — 700105 HCHG RX REV CODE 258: Performed by: INTERNAL MEDICINE

## 2021-06-21 PROCEDURE — 700102 HCHG RX REV CODE 250 W/ 637 OVERRIDE(OP): Performed by: INTERNAL MEDICINE

## 2021-06-21 PROCEDURE — 96375 TX/PRO/DX INJ NEW DRUG ADDON: CPT

## 2021-06-21 PROCEDURE — 700111 HCHG RX REV CODE 636 W/ 250 OVERRIDE (IP): Performed by: INTERNAL MEDICINE

## 2021-06-21 PROCEDURE — 99232 SBSQ HOSP IP/OBS MODERATE 35: CPT | Performed by: INTERNAL MEDICINE

## 2021-06-21 PROCEDURE — 36415 COLL VENOUS BLD VENIPUNCTURE: CPT

## 2021-06-21 PROCEDURE — 770001 HCHG ROOM/CARE - MED/SURG/GYN PRIV*

## 2021-06-21 RX ORDER — FUROSEMIDE 10 MG/ML
20 INJECTION INTRAMUSCULAR; INTRAVENOUS ONCE
Status: COMPLETED | OUTPATIENT
Start: 2021-06-21 | End: 2021-06-21

## 2021-06-21 RX ORDER — TRAMADOL HYDROCHLORIDE 50 MG/1
50 TABLET ORAL EVERY 4 HOURS PRN
Status: DISCONTINUED | OUTPATIENT
Start: 2021-06-21 | End: 2021-06-23 | Stop reason: HOSPADM

## 2021-06-21 RX ADMIN — TRAMADOL HYDROCHLORIDE 50 MG: 50 TABLET, FILM COATED ORAL at 15:21

## 2021-06-21 RX ADMIN — VANCOMYCIN HYDROCHLORIDE 1750 MG: 500 INJECTION, POWDER, LYOPHILIZED, FOR SOLUTION INTRAVENOUS at 18:33

## 2021-06-21 RX ADMIN — AMPICILLIN SODIUM AND SULBACTAM SODIUM 3 G: 2; 1 INJECTION, POWDER, FOR SOLUTION INTRAMUSCULAR; INTRAVENOUS at 11:30

## 2021-06-21 RX ADMIN — ENOXAPARIN SODIUM 40 MG: 40 INJECTION SUBCUTANEOUS at 05:00

## 2021-06-21 RX ADMIN — ACETAMINOPHEN 650 MG: 325 TABLET, FILM COATED ORAL at 18:37

## 2021-06-21 RX ADMIN — VANCOMYCIN HYDROCHLORIDE 1750 MG: 500 INJECTION, POWDER, LYOPHILIZED, FOR SOLUTION INTRAVENOUS at 05:30

## 2021-06-21 RX ADMIN — ACETAMINOPHEN 650 MG: 325 TABLET, FILM COATED ORAL at 11:30

## 2021-06-21 RX ADMIN — AMPICILLIN SODIUM AND SULBACTAM SODIUM 3 G: 2; 1 INJECTION, POWDER, FOR SOLUTION INTRAMUSCULAR; INTRAVENOUS at 20:49

## 2021-06-21 RX ADMIN — FUROSEMIDE 20 MG: 10 INJECTION, SOLUTION INTRAMUSCULAR; INTRAVENOUS at 09:53

## 2021-06-21 RX ADMIN — TRAMADOL HYDROCHLORIDE 50 MG: 50 TABLET, FILM COATED ORAL at 21:35

## 2021-06-21 RX ADMIN — AMPICILLIN SODIUM AND SULBACTAM SODIUM 3 G: 2; 1 INJECTION, POWDER, FOR SOLUTION INTRAMUSCULAR; INTRAVENOUS at 05:00

## 2021-06-21 ASSESSMENT — ENCOUNTER SYMPTOMS
SHORTNESS OF BREATH: 0
NAUSEA: 0
ROS SKIN COMMENTS: OPEN WOUND
CHILLS: 0
MYALGIAS: 0
WEAKNESS: 0
ROS GI COMMENTS: LOOSE STOOL
VOMITING: 0
FEVER: 0
ABDOMINAL PAIN: 0

## 2021-06-21 ASSESSMENT — PAIN DESCRIPTION - PAIN TYPE
TYPE: ACUTE PAIN

## 2021-06-21 NOTE — PROGRESS NOTES
[facility] Medicine Daily Progress Note    Date of Service  6/21/2021    Chief Complaint  56 y.o. male admitted 6/20/2021 with wound infection    Hospital Course  56 y.o. male who presented 6/20/2021 with prior right ankle fracture s/p surgery who had puncture wound 6 months ago. He's has been taking care of his wound on his own, his wife also has chronic wounds that he cares for. For the past several days he's noticed increased edema, erythema, and purulent drainage from the wound. On 5 days ago he had F/C, headache and 2 episodes of diarrhea. He went to clinic 2 days ago and prescribed oral antibiotics which he started but he didn't feel well and came to the ED. He did have doppler that was neg for DVT    Interval Problem Update  Temp 100.2  His erythema is slightly better, otherwise similar to yesterday  He has loose stool denies abd pain, N/V  He does not have much pain at rest    Consultants/Specialty  None    Code Status  Full Code    Disposition  Home tomorrow if cellulitis improved    Review of Systems  Review of Systems   Constitutional: Negative for chills and fever.   Respiratory: Negative for shortness of breath.    Cardiovascular: Positive for leg swelling. Negative for chest pain.   Gastrointestinal: Negative for abdominal pain, nausea and vomiting.        Loose stool   Musculoskeletal: Negative for myalgias.   Skin:        Open wound   Neurological: Negative for weakness.        Physical Exam  Temp:  [36.1 °C (97 °F)-37.9 °C (100.2 °F)] 37.3 °C (99.2 °F)  Pulse:  [] 90  Resp:  [13-20] 18  BP: (133-161)/() 152/80  SpO2:  [91 %-94 %] 94 %    Physical Exam  Vitals and nursing note reviewed.   Constitutional:       General: He is not in acute distress.     Appearance: He is not toxic-appearing.   HENT:      Head: Normocephalic.      Mouth/Throat:      Mouth: Mucous membranes are moist.   Eyes:      General:         Right eye: No discharge.         Left eye: No discharge.   Cardiovascular:       Rate and Rhythm: Normal rate and regular rhythm.   Pulmonary:      Effort: Pulmonary effort is normal. No respiratory distress.      Breath sounds: No wheezing or rales.   Abdominal:      Palpations: Abdomen is soft.      Tenderness: There is no abdominal tenderness.   Musculoskeletal:      Cervical back: Neck supple.      Right lower leg: Edema (R>L) present.      Left lower leg: Edema present.   Skin:     General: Skin is warm and dry.      Comments: Right lower lateral leg with ulceration and yellow drainage, erythema from knee to ankle with increased warmth   Neurological:      Mental Status: He is alert.      Comments: AOx4         Fluids    Intake/Output Summary (Last 24 hours) at 6/21/2021 0906  Last data filed at 6/20/2021 1751  Gross per 24 hour   Intake 840 ml   Output --   Net 840 ml       Laboratory  Recent Labs     06/20/21  1004 06/21/21  0416   WBC 16.4* 11.9*   RBC 4.98 4.57*   HEMOGLOBIN 15.3 13.8*   HEMATOCRIT 46.5 42.6   MCV 93.4 93.2   MCH 30.7 30.2   MCHC 32.9* 32.4*   RDW 45.7 46.1   PLATELETCT 205 227   MPV 9.8 9.7     Recent Labs     06/20/21  1004 06/21/21  0416   SODIUM 136 139   POTASSIUM 3.8 4.0   CHLORIDE 101 104   CO2 23 27   GLUCOSE 117* 102*   BUN 11 11   CREATININE 0.77 0.67   CALCIUM 9.1 8.7                   Imaging  No orders to display        Assessment/Plan  * Cellulitis of right lower extremity  Assessment & Plan  Continue unasyn and vanc given purulent drainage  Wound culture and blood cultures pending  Elevate leg, lasix x1 today for edema    Sepsis (HCC)  Assessment & Plan  This is Sepsis Present on admission  SIRS criteria identified on my evaluation include: Tachycardia, with heart rate greater than 90 BPM and Leukocytosis, with WBC greater than 12,000  Source is skin  Sepsis protocol initiated  Fluid resuscitation ordered per protocol  IV antibiotics as appropriate for source of sepsis  While organ dysfunction may be noted elsewhere in this problem list or in the chart,  degree of organ dysfunction does not meet CMS criteria for severe sepsis  Improving             VTE prophylaxis: lovenox

## 2021-06-21 NOTE — ED PROVIDER NOTES
CHIEF COMPLAINT  Chief Complaint   Patient presents with   • Wound Infection   • Leg Pain       HPI  Joni Stone is a 56 y.o. male who presents with a report that he had a puncture wound about 6 months ago and he has been taking care of the wound on his own as well as other chronic wounds but is noticing erythema and edema and some drainage from the wound.  He is static couple episodes of diarrhea and this is after being prescribed oral antibiotics but says that he is concerned that he has a recurrence of infection.  Has had some subjective fevers and chills and malaise.    REVIEW OF SYSTEMS  See HPI for further details. All other systems are negative.     PAST MEDICAL HISTORY  History reviewed. No pertinent past medical history.    FAMILY HISTORY  Family History   Problem Relation Age of Onset   • Hypertension Mother    • Cancer Father        SOCIAL HISTORY   reports that he has never smoked. He has quit using smokeless tobacco.  His smokeless tobacco use included snuff. He reports current alcohol use of about 3.5 oz of alcohol per week. He reports that he does not use drugs.    SURGICAL HISTORY  Past Surgical History:   Procedure Laterality Date   • OPEN REDUCTION         CURRENT MEDICATIONS  Home Medications     Reviewed by Heath Salgado (Pharmacy Tech) on 06/20/21 at 1017  Med List Status: Complete   Medication Last Dose Status   acetaminophen (TYLENOL) 500 MG Tab 6/20/2021 Active   amoxicillin-clavulanate (AUGMENTIN) 875-125 MG Tab 6/20/2021 Active   doxycycline (VIBRAMYCIN) 100 MG Tab 6/20/2021 Active   glucosamine 500 MG Cap 6/17/2021 Active   naproxen (ALEVE) 220 MG tablet 6/15/2021 Active                ALLERGIES  No Known Allergies    PHYSICAL EXAM  VITAL SIGNS: /66   Pulse 88   Temp 37 °C (98.6 °F) (Oral)   Resp 18   Ht 1.829 m (6')   Wt (!) 163 kg (359 lb 5.6 oz)   SpO2 94%   BMI 48.74 kg/m²    Constitutional: Well developed, Well nourished, No acute distress, Non-toxic  appearance.   HENT: Normocephalic, Atraumatic, Bilateral external ears normal, Oropharynx is clear mucous membranes are moist. No oral exudates or nasal discharge.   Eyes: Pupils are equal round and reactive, EOMI, Conjunctiva normal, No discharge.   Neck: Normal range of motion, No tenderness, Supple, No stridor. No meningismus.  Lymphatic: No lymphadenopathy noted.   Cardiovascular: Tachycardia rate and rhythm without murmur rub or gallop.  Thorax & Lungs: Clear breath sounds bilaterally without wheezes, rhonchi or rales. There is no chest wall tenderness.   Abdomen: Soft non-tender non-distended. There is no rebound or guarding. No organomegaly is appreciated. Bowel sounds are normal.  Skin: Normal without rash.   Back: No CVA or spinal tenderness.   Extremities: Intact distal pulses, bilateral lower extremity erythema edema, bilateral lower extremity tenderness, No cyanosis, No clubbing. Capillary refill is less than 2 seconds.  Right leg shows  Musculoskeletal: Good range of motion in all major joints. No tenderness to palpation or major deformities noted.   Neurologic: Alert & oriented x 3, Normal motor function, Normal sensory function, No focal deficits noted. Reflexes are normal.  Psychiatric: Affect normal, Judgment normal, Mood normal. There is no suicidal ideation or patient reported hallucinations.       RADIOLOGY/PROCEDURES  No orders to display   Ultrasound right lower extremity shows no evidence of DVT      COURSE & MEDICAL DECISION MAKING  Pertinent Labs & Imaging studies reviewed. (See chart for details)  Laboratory evaluation reveals leukocytosis, minimal shift, no electrolyte derangements or acidosis.    There is no evidence of DVT on right lower extremity ultrasound  Patient met SIRS criteria on admission secondary to tachycardia and white count greater than 12,000.  Given fluid resuscitation and early antibiotics for possible sepsis.    I will admit the patient to the hospital service for  cellulitis of right lower extremity to lesser extent the left and he is admitted in stable condition    FINAL IMPRESSION  1. Cellulitis of right lower extremity    2. Obesity due to excess calories with serious comorbidity, unspecified classification             Electronically signed by: Lamberto Judge M.D., 6/21/2021 1:48 PM

## 2021-06-21 NOTE — DIETARY
NUTRITION SERVICES: BMI - Pt with BMI >40 (=Body mass index is 48.74 kg/m².), Class III obesity. Weight loss counseling not appropriate in acute care setting. RECOMMEND - If appropriate at DC please refer to outpatient nutrition services for weight management.

## 2021-06-21 NOTE — CARE PLAN
The patient is Stable - Low risk of patient condition declining or worsening    Shift Goals  Clinical Goals: Pain Control  Patient Goals: Pain Control, Oral Care    Progress made toward(s) clinical / shift goals:  YES    Problem: Pain - Standard  Goal: Alleviation of pain or a reduction in pain to the patient’s comfort goal  Outcome: Progressing  Note:   Continue pain control regimen to reduce pain to comfort level for sleep.

## 2021-06-21 NOTE — CARE PLAN
The patient is Stable - Low risk of patient condition declining or worsening    Shift Goals  Clinical Goals: Patient pain will remain at a tolerable pain level.    Progress made toward(s) clinical / shift goals:  Patient pain is at 2/10 throughout the shift. Patient has been eleveting his right lig to reduce swelling. IV antibiotic is given per order.      Problem: Hemodynamics  Goal: Patient's hemodynamics, fluid balance and neurologic status will be stable or improve  Outcome: Progressing

## 2021-06-22 PROBLEM — E66.813 CLASS 3 SEVERE OBESITY IN ADULT (HCC): Status: ACTIVE | Noted: 2021-06-22

## 2021-06-22 PROBLEM — I87.2 CHRONIC VENOUS STASIS DERMATITIS OF BOTH LOWER EXTREMITIES: Status: ACTIVE | Noted: 2021-06-22

## 2021-06-22 PROBLEM — E66.01 CLASS 3 SEVERE OBESITY IN ADULT (HCC): Status: ACTIVE | Noted: 2021-06-22

## 2021-06-22 PROBLEM — E87.1 HYPONATREMIA: Status: ACTIVE | Noted: 2021-06-22

## 2021-06-22 LAB
ANION GAP SERPL CALC-SCNC: 11 MMOL/L (ref 7–16)
BACTERIA WND AEROBE CULT: ABNORMAL
BASOPHILS # BLD AUTO: 1 % (ref 0–1.8)
BASOPHILS # BLD: 0.09 K/UL (ref 0–0.12)
BUN SERPL-MCNC: 11 MG/DL (ref 8–22)
CALCIUM SERPL-MCNC: 8.2 MG/DL (ref 8.4–10.2)
CHLORIDE SERPL-SCNC: 103 MMOL/L (ref 96–112)
CO2 SERPL-SCNC: 20 MMOL/L (ref 20–33)
CREAT SERPL-MCNC: 0.75 MG/DL (ref 0.5–1.4)
EOSINOPHIL # BLD AUTO: 0.45 K/UL (ref 0–0.51)
EOSINOPHIL NFR BLD: 5 % (ref 0–6.9)
ERYTHROCYTE [DISTWIDTH] IN BLOOD BY AUTOMATED COUNT: 49.6 FL (ref 35.9–50)
GLUCOSE SERPL-MCNC: 87 MG/DL (ref 65–99)
GRAM STN SPEC: ABNORMAL
HCT VFR BLD AUTO: 46.4 % (ref 42–52)
HGB BLD-MCNC: 14.3 G/DL (ref 14–18)
IMM GRANULOCYTES # BLD AUTO: 0.08 K/UL (ref 0–0.11)
IMM GRANULOCYTES NFR BLD AUTO: 0.9 % (ref 0–0.9)
LYMPHOCYTES # BLD AUTO: 1.93 K/UL (ref 1–4.8)
LYMPHOCYTES NFR BLD: 21.3 % (ref 22–41)
MCH RBC QN AUTO: 30.4 PG (ref 27–33)
MCHC RBC AUTO-ENTMCNC: 30.8 G/DL (ref 33.7–35.3)
MCV RBC AUTO: 98.5 FL (ref 81.4–97.8)
MONOCYTES # BLD AUTO: 0.74 K/UL (ref 0–0.85)
MONOCYTES NFR BLD AUTO: 8.1 % (ref 0–13.4)
NEUTROPHILS # BLD AUTO: 5.79 K/UL (ref 1.82–7.42)
NEUTROPHILS NFR BLD: 63.7 % (ref 44–72)
NRBC # BLD AUTO: 0 K/UL
NRBC BLD-RTO: 0 /100 WBC
PLATELET # BLD AUTO: 269 K/UL (ref 164–446)
PMV BLD AUTO: 10 FL (ref 9–12.9)
POTASSIUM SERPL-SCNC: 5.1 MMOL/L (ref 3.6–5.5)
RBC # BLD AUTO: 4.71 M/UL (ref 4.7–6.1)
SIGNIFICANT IND 70042: ABNORMAL
SITE SITE: ABNORMAL
SODIUM SERPL-SCNC: 134 MMOL/L (ref 135–145)
SOURCE SOURCE: ABNORMAL
WBC # BLD AUTO: 9.1 K/UL (ref 4.8–10.8)

## 2021-06-22 PROCEDURE — A9270 NON-COVERED ITEM OR SERVICE: HCPCS | Performed by: INTERNAL MEDICINE

## 2021-06-22 PROCEDURE — 700102 HCHG RX REV CODE 250 W/ 637 OVERRIDE(OP): Performed by: INTERNAL MEDICINE

## 2021-06-22 PROCEDURE — 36415 COLL VENOUS BLD VENIPUNCTURE: CPT

## 2021-06-22 PROCEDURE — 700105 HCHG RX REV CODE 258: Performed by: INTERNAL MEDICINE

## 2021-06-22 PROCEDURE — 770001 HCHG ROOM/CARE - MED/SURG/GYN PRIV*

## 2021-06-22 PROCEDURE — 99233 SBSQ HOSP IP/OBS HIGH 50: CPT | Performed by: INTERNAL MEDICINE

## 2021-06-22 PROCEDURE — 80048 BASIC METABOLIC PNL TOTAL CA: CPT

## 2021-06-22 PROCEDURE — 0HDKXZZ EXTRACTION OF RIGHT LOWER LEG SKIN, EXTERNAL APPROACH: ICD-10-PCS | Performed by: INTERNAL MEDICINE

## 2021-06-22 PROCEDURE — 97597 DBRDMT OPN WND 1ST 20 CM/<: CPT

## 2021-06-22 PROCEDURE — 700111 HCHG RX REV CODE 636 W/ 250 OVERRIDE (IP): Performed by: INTERNAL MEDICINE

## 2021-06-22 PROCEDURE — 85025 COMPLETE CBC W/AUTO DIFF WBC: CPT

## 2021-06-22 RX ORDER — AMOXICILLIN 250 MG/1
500 CAPSULE ORAL EVERY 8 HOURS
Status: DISCONTINUED | OUTPATIENT
Start: 2021-06-22 | End: 2021-06-23 | Stop reason: HOSPADM

## 2021-06-22 RX ORDER — AMOXICILLIN 500 MG/1
500 CAPSULE ORAL 3 TIMES DAILY
Qty: 6 CAPSULE | Refills: 0 | Status: SHIPPED | OUTPATIENT
Start: 2021-06-23 | End: 2021-06-25

## 2021-06-22 RX ORDER — LINEZOLID 600 MG/1
600 TABLET, FILM COATED ORAL 2 TIMES DAILY
Qty: 4 TABLET | Refills: 0 | Status: SHIPPED | OUTPATIENT
Start: 2021-06-23 | End: 2021-06-25

## 2021-06-22 RX ORDER — LINEZOLID 600 MG/1
600 TABLET, FILM COATED ORAL EVERY 12 HOURS
Status: DISCONTINUED | OUTPATIENT
Start: 2021-06-22 | End: 2021-06-23 | Stop reason: HOSPADM

## 2021-06-22 RX ADMIN — ACETAMINOPHEN 650 MG: 325 TABLET, FILM COATED ORAL at 08:57

## 2021-06-22 RX ADMIN — LINEZOLID 600 MG: 600 TABLET, FILM COATED ORAL at 17:31

## 2021-06-22 RX ADMIN — TRAMADOL HYDROCHLORIDE 50 MG: 50 TABLET, FILM COATED ORAL at 21:56

## 2021-06-22 RX ADMIN — ENOXAPARIN SODIUM 40 MG: 40 INJECTION SUBCUTANEOUS at 05:35

## 2021-06-22 RX ADMIN — TRAMADOL HYDROCHLORIDE 50 MG: 50 TABLET, FILM COATED ORAL at 05:34

## 2021-06-22 RX ADMIN — ACETAMINOPHEN 650 MG: 325 TABLET, FILM COATED ORAL at 17:31

## 2021-06-22 RX ADMIN — AMOXICILLIN 500 MG: 250 CAPSULE ORAL at 21:49

## 2021-06-22 RX ADMIN — AMOXICILLIN 500 MG: 250 CAPSULE ORAL at 14:09

## 2021-06-22 RX ADMIN — AMPICILLIN SODIUM AND SULBACTAM SODIUM 3 G: 2; 1 INJECTION, POWDER, FOR SOLUTION INTRAMUSCULAR; INTRAVENOUS at 03:00

## 2021-06-22 RX ADMIN — AMPICILLIN SODIUM AND SULBACTAM SODIUM 3 G: 2; 1 INJECTION, POWDER, FOR SOLUTION INTRAMUSCULAR; INTRAVENOUS at 08:56

## 2021-06-22 RX ADMIN — TRAMADOL HYDROCHLORIDE 50 MG: 50 TABLET, FILM COATED ORAL at 14:12

## 2021-06-22 RX ADMIN — VANCOMYCIN HYDROCHLORIDE 1750 MG: 500 INJECTION, POWDER, LYOPHILIZED, FOR SOLUTION INTRAVENOUS at 05:35

## 2021-06-22 ASSESSMENT — PAIN DESCRIPTION - PAIN TYPE
TYPE: ACUTE PAIN

## 2021-06-22 ASSESSMENT — PATIENT HEALTH QUESTIONNAIRE - PHQ9
1. LITTLE INTEREST OR PLEASURE IN DOING THINGS: NOT AT ALL
SUM OF ALL RESPONSES TO PHQ9 QUESTIONS 1 AND 2: 0
2. FEELING DOWN, DEPRESSED, IRRITABLE, OR HOPELESS: NOT AT ALL

## 2021-06-22 ASSESSMENT — ENCOUNTER SYMPTOMS
MYALGIAS: 0
NAUSEA: 0
FEVER: 0
ROS GI COMMENTS: LOOSE STOOL
ROS SKIN COMMENTS: OPEN WOUND
ABDOMINAL PAIN: 0
VOMITING: 0
CHILLS: 0
SHORTNESS OF BREATH: 0
WEAKNESS: 0

## 2021-06-22 NOTE — ASSESSMENT & PLAN NOTE
Patient does have chronic venous stasis dermatitis, likely exacerbating current cellulitis.  I did explain the patient does have increased risk for repeat cellulitis and will need to have this closely monitored as an outpatient.

## 2021-06-22 NOTE — HOSPITAL COURSE
"Per notes, \"56 y.o. male who presented 6/20/2021 with prior right ankle fracture s/p surgery who had puncture wound 6 months ago. He's has been taking care of his wound on his own, his wife also has chronic wounds that he cares for. For the past several days he's noticed increased edema, erythema, and purulent drainage from the wound. On 5 days ago he had F/C, headache and 2 episodes of diarrhea. He went to clinic 2 days ago and prescribed oral antibiotics which he started but he didn't feel well and came to the ED. He did have doppler that was neg for DVT\"  "

## 2021-06-22 NOTE — WOUND TEAM
Procedures:                Debridement :  curette used to debride wound bed of right lateral LE.   Entire surface of wound, ~2.0cm2, debrided, removing non viable tissue.              Cleansed with:  Normal Saline                                                                                   Primary dressing: hydrofera blue              Secondary Dressing: silicone adhesive foam              Other: Tubigrib size F

## 2021-06-22 NOTE — WOUND TEAM
[facility]  Inpatient Services  Initial Wound and Skin Care Evaluation    Admission Date: 6/20/2021     Last order of IP CONSULT TO WOUND CARE was found on 6/21/2021 from Hospital Encounter on 6/20/2021     HPI, PMH, SH: Reviewed    Past Surgical History:   Procedure Laterality Date   • OPEN REDUCTION       Social History     Tobacco Use   • Smoking status: Never Smoker   • Smokeless tobacco: Former User     Types: Snuff   Substance Use Topics   • Alcohol use: Yes     Alcohol/week: 3.5 oz     Types: 7 Cans of beer per week     Comment: Occasionally     Chief Complaint   Patient presents with   • Wound Infection   • Leg Pain     Diagnosis: Cellulitis [L03.90]    Unit where seen by Wound Team: 2206/00     WOUND CONSULT/FOLLOW UP RELATED TO:  R lateral leg     WOUND HISTORY:  Patient states evergreen tree branch injured his leg 6 months ago, non healing.    WOUND ASSESSMENT/LDA  Wound 06/21/21 Venous Ulcer Leg Lateral Right (Active)   Wound Image   06/22/21 1200   Site Assessment Red 06/22/21 1200   Periwound Assessment Purple;Hemosiderin Staining;Red;Edema 06/22/21 1200   Margins Defined edges;Attached edges 06/22/21 1200   Closure Secondary intention 06/22/21 1200   Drainage Amount Small 06/22/21 1200   Drainage Description Serous 06/22/21 1200   Treatments Cleansed;Site care;CSWD - Conservative Sharp Wound Debridement;Irrigation;Compression 06/22/21 1200   Wound Cleansing Normal Saline Irrigation 06/22/21 1200   Dressing Options Hydrofera Blue Ready;Silicone Adhesive Foam;Tubigrip 06/22/21 1200   Dressing Changed New 06/22/21 1200   Dressing Status Clean;Intact;Dry 06/22/21 1200   Dressing Change/Treatment Frequency Every 72 hrs, and As Needed 06/22/21 1200   NEXT Dressing Change/Treatment Date 06/25/21 06/22/21 1200   NEXT Weekly Photo (Inpatient Only) 06/29/21 06/22/21 1200   Non-staged Wound Description Full thickness 06/22/21 1200   Wound Length (cm) 1.5 cm 06/22/21 1200   Wound Width (cm) 1.5 cm  06/22/21 1200   Wound Depth (cm) 0.1 cm 06/22/21 1200   Wound Surface Area (cm^2) 2.25 cm^2 06/22/21 1200   Wound Volume (cm^3) 0.22 cm^3 06/22/21 1200   Shape circular 06/22/21 1200   Wound Odor None 06/22/21 1200   Pulses 2+ 06/22/21 1200   Exposed Structures None 06/22/21 1200   WOUND NURSE ONLY - Time Spent with Patient (mins) 45 06/22/21 1200   Number of days: 1        Vascular:    MARIMAR:   No results found.    Lab Values:    Lab Results   Component Value Date/Time    WBC 9.1 06/22/2021 03:56 AM    RBC 4.71 06/22/2021 03:56 AM    HEMOGLOBIN 14.3 06/22/2021 03:56 AM    HEMATOCRIT 46.4 06/22/2021 03:56 AM    CREACTPROT 1.78 (H) 01/03/2014 01:40 PM    SEDRATEWES 31 (H) 01/03/2014 01:40 PM        Culture Results show:  Recent Results (from the past 720 hour(s))   CULTURE WOUND W/ GRAM STAIN    Collection Time: 06/20/21  1:40 PM    Specimen: Right Leg; Wound   Result Value Ref Range    Significant Indicator POS (POS)     Source WND     Site RIGHT LEG     Culture Result - (A)     Gram Stain Result       Rare Gram positive cocci.  Few Gram positive rods.      Culture Result (A)      Methicillin Resistant Staphylococcus aureus  Moderate growth  This isolate is presumed to be clindamycin resistant based on  detection of inducible resistance.  Clindamycin may still  be effective in some patients.      Culture Result Corynebacterium striatum group  Moderate growth   (A)        Susceptibility    Methicillin resistant staphylococcus aureus - HANS     Azithromycin >4 Resistant mcg/mL     Clindamycin 0.5 Resistant mcg/mL     Cefazolin >16 Resistant mcg/mL     Cefepime >16 Resistant mcg/mL     Ceftaroline 1 Sensitive mcg/mL     Daptomycin 1 Sensitive mcg/mL     Ampicillin/sulbactam 16/8 Resistant mcg/mL     Erythromycin >4 Resistant mcg/mL     Vancomycin 2 Sensitive mcg/mL     Oxacillin >2 Resistant mcg/mL     Trimeth/Sulfa >2/38 Resistant mcg/mL     Tetracycline <=4 Sensitive mcg/mL       Pain Level/Medicated:  0/10        INTERVENTIONS BY WOUND TEAM:  Chart and images reviewed. Discussed with bedside RN. All areas of concern (based on picture review, LDA review and discussion with bedside RN) have been thoroughly assessed. Documentation of areas based on significant findings. This RN in to assess patient. Performed standard wound care which includes appropriate positioning, dressing removal and non-selective debridement. Pictures and measurements obtained weekly if/when required.  Preparation for Dressing removal:   Cleansed with:  NS and gauze.  Sharp debridement: CSWD done by Adriana wound care RN  April wound: Cleansed with NS  Primary Dressing: Hydrofera blue  Secondary (Outer) Dressing: Silcone adhesive foam and Tubi F    Interdisciplinary consultation: Patient, Bedside RN, Wound care RN Adriana    EVALUATION / RATIONALE FOR TREATMENT:  Most Recent Date:  06/22/21: Patient has hemosiderin staining to the RLE, edema and serous fluid drainage.  Wound bed is red and granular.  Hydrofera Blue applied for the hydrophilic polyurethane foam which contains ethylene oxide used as a bactericidal, fungicidal, and sporicidal disinfectant. Hydrofera Blue also aids in maintaining a moist wound environment. The absorption properties of this dressing are important in collecting exudates and bacteria from the injured area. These harmful fluid secretions bind to the dressing removing it from the wound without the foam sticking to the wound causing more harm.  Tubigrip F placed for light compression.               Goals: Steady decrease in wound area and depth weekly.    WOUND TEAM PLAN OF CARE ([X] for frequency of wound follow up,): NA  Nursing to follow orders written for wound care. Contact wound team if area fails to progress, deteriorates or with any questions/concerns  Dressing changes by wound team:                   Follow up 3 times weekly:                NPWT change 3 times weekly:     Follow up 1-2 times weekly:      Follow up  Bi-Monthly:                   Follow up as needed:   X  Other (explain):     NURSING PLAN OF CARE ORDERS (X):  Dressing changes: See Dressing Care orders: X  Skin care: See Skin Care orders:   RN Prevention Protocol:   Rectal tube care: See Rectal Tube Care orders:   Other orders:    RSKIN:   CURRENTLY IN PLACE (X), APPLIED THIS VISIT (A), ORDERED (O):   Q shift Elijah:  X  Q shift pressure point assessments:  X    Surface/Positioning   Pressure redistribution mattress     X       Low Airloss          Bariatric foam      Bariatric RADHA     Waffle cushion        Waffle Overlay          Reposition q 2 hours      TAPs Turning system     Z Rasta Pillow     Offloading/Redistribution NA  Sacral Mepilex (Silicone dressing)     Heel Mepilex (Silicone dressing)         Heel float boots (Prevalon boot)             Float Heels off Bed with Pillows           Respiratory NA  Silicone O2 tubing         Gray Foam Ear protectors     Cannula fixation Device (Tender )          High flow offloading Clip    Elastic head band offloading device      Anchorfast                                                         Trach with Optifoam split foam             Containment/Moisture Prevention NA    Rectal tube or BMS    Purwick/Condom Cath        Iglesias Catheter    Barrier wipes           Barrier paste       Antifungal tx      Interdry        Mobilization NA      Up to chair        Ambulate      PT/OT      Nutrition       Dietician        Diabetes Education      PO X    TF     TPN     NPO   # days     Other        Anticipated discharge plans: Would recommend outpatient wound for patient follow up.  LTACH:        SNF/Rehab:                  Home Health Care:           Outpatient Wound Center:    X        Self/Family Care:        Other:

## 2021-06-22 NOTE — ASSESSMENT & PLAN NOTE
- Discussed diet and lifestyle modifications with patient  - Patient will need to follow up with PCP for outpatient management

## 2021-06-22 NOTE — DISCHARGE PLANNING
Anticipated Discharge Disposition: D/C to Home/Self-Care    Action: MIN was able to arrange [facility] wound clinic appt for tomorrow, 6/23 @ 10:30am. DONNAW messaged MD who indicated this should not be an issue. MIN confirmed with wound clinic scheduler that pt's insurance has approved the appt.    Barriers to Discharge: None.    Plan: Pt to discharge early tomorrow morning and go straight to first wound clinic appt.

## 2021-06-22 NOTE — DISCHARGE PLANNING
Anticipated Discharge Disposition: D/C to Home/Self-Care    Action: During IDT rounds it was discussed pt may need HH for wound care at time of d/c. LSW met with pt at bedside to discuss potential HH need. Pt reported he is independent with ADLs and IADLs at baseline and he anticipates returning to work on Monday, June 28th. Pt feels confident about going to outpatient wound care and is agreeable to this plan.    LSW updated MD via Voalte on the above.    Barriers to Discharge: None.    Plan: LSW to contact wound clinic to arrange appointments once referral has been entered.    Addendum: LSW left  for [facility] ([phone]) with a request to call back to schedule appts.

## 2021-06-22 NOTE — CARE PLAN
The patient is Stable - Low risk of patient condition declining or worsening      Shift Goals  Clinical Goals: Pain control  Patient Goals: Pain Control, Oral Care    Progress made toward(s) clinical / shift goals:        Problem: Knowledge Deficit - Standard  Goal: Patient and family/care givers will demonstrate understanding of plan of care, disease process/condition, diagnostic tests and medications  Outcome: Progressing     Problem: Hemodynamics  Goal: Patient's hemodynamics, fluid balance and neurologic status will be stable or improve  Outcome: Progressing     Problem: Urinary - Renal Perfusion  Goal: Ability to achieve and maintain adequate renal perfusion and functioning will improve  Outcome: Progressing       Patient is not progressing towards the following goals:

## 2021-06-22 NOTE — PROGRESS NOTES
"Hospital Medicine Daily Progress Note    Date of Service  6/22/2021    Chief Complaint  56 y.o. male admitted 6/20/2021 with wound infection    Hospital Course  Per notes, \"56 y.o. male who presented 6/20/2021 with prior right ankle fracture s/p surgery who had puncture wound 6 months ago. He's has been taking care of his wound on his own, his wife also has chronic wounds that he cares for. For the past several days he's noticed increased edema, erythema, and purulent drainage from the wound. On 5 days ago he had F/C, headache and 2 episodes of diarrhea. He went to clinic 2 days ago and prescribed oral antibiotics which he started but he didn't feel well and came to the ED. He did have doppler that was neg for DVT\"    Interval Problem Update  Seen and examined this morning at bedside, still having fevers in the last 24 hours, will stepdown to oral antibiotics and monitored overnight.    Wound care has been arranged as an outpatient, first appointment is 6/23 at 1030.  Patient will be discharged prior to appointment tomorrow a.m.    Consultants/Specialty  None    Code Status  Full Code    Disposition  Anticipated discharge to home    Review of Systems  Review of Systems   Constitutional: Negative for chills and fever.   Respiratory: Negative for shortness of breath.    Cardiovascular: Positive for leg swelling. Negative for chest pain.   Gastrointestinal: Negative for abdominal pain, nausea and vomiting.        Loose stool   Musculoskeletal: Negative for myalgias.   Skin:        Open wound   Neurological: Negative for weakness.        Physical Exam  Temp:  [36.6 °C (97.9 °F)-37.7 °C (99.8 °F)] 36.6 °C (97.9 °F)  Pulse:  [86-89] 86  Resp:  [18] 18  BP: (142-157)/(71-72) 142/72  SpO2:  [91 %-94 %] 93 %    Physical Exam  Vitals and nursing note reviewed.   Constitutional:       General: He is not in acute distress.     Appearance: He is not toxic-appearing.   HENT:      Head: Normocephalic.      Mouth/Throat:      " Mouth: Mucous membranes are moist.   Eyes:      General:         Right eye: No discharge.         Left eye: No discharge.   Cardiovascular:      Rate and Rhythm: Normal rate and regular rhythm.   Pulmonary:      Effort: Pulmonary effort is normal. No respiratory distress.      Breath sounds: No wheezing or rales.   Abdominal:      Palpations: Abdomen is soft.      Tenderness: There is no abdominal tenderness.   Musculoskeletal:      Cervical back: Neck supple.      Right lower leg: Edema (R>L) present.      Left lower leg: Edema present.   Skin:     General: Skin is warm and dry.      Comments: Right lower lateral leg with ulceration and yellow drainage, erythema from knee to ankle with increased warmth   Neurological:      Mental Status: He is alert.      Comments: AOx4         Fluids    Intake/Output Summary (Last 24 hours) at 6/22/2021 1620  Last data filed at 6/22/2021 0800  Gross per 24 hour   Intake 1720 ml   Output --   Net 1720 ml       Laboratory  Recent Labs     06/20/21  1004 06/21/21  0416 06/22/21  0356   WBC 16.4* 11.9* 9.1   RBC 4.98 4.57* 4.71   HEMOGLOBIN 15.3 13.8* 14.3   HEMATOCRIT 46.5 42.6 46.4   MCV 93.4 93.2 98.5*   MCH 30.7 30.2 30.4   MCHC 32.9* 32.4* 30.8*   RDW 45.7 46.1 49.6   PLATELETCT 205 227 269   MPV 9.8 9.7 10.0     Recent Labs     06/20/21  1004 06/21/21  0416 06/22/21  0356   SODIUM 136 139 134*   POTASSIUM 3.8 4.0 5.1   CHLORIDE 101 104 103   CO2 23 27 20   GLUCOSE 117* 102* 87   BUN 11 11 11   CREATININE 0.77 0.67 0.75   CALCIUM 9.1 8.7 8.2*                   Imaging  No orders to display        Assessment/Plan  * Cellulitis of right lower extremity  Assessment & Plan  Failed outpatient antibiotic therapy with doxycycline, was prescribed amoxicillin, did not take.  Continue unasyn and vanc given purulent drainage  Wound cultures show MRSA and polymicrobial, given failed outpatient antibiotic therapy with doxycycline, will start linezolid and continue with amoxicillin to complete  a 5-day course of treatment.  Elevate leg, was also given lasix x1 today for edema  Outpatient wound care    Hyponatremia  Assessment & Plan  Mild, asymptomatic  Monitor with labs    Chronic venous stasis dermatitis of both lower extremities- (present on admission)  Assessment & Plan  Patient does have chronic venous stasis dermatitis, likely exacerbating current cellulitis.  I did explain the patient does have increased risk for repeat cellulitis and will need to have this closely monitored as an outpatient.    Class 3 severe obesity in adult (HCC)- (present on admission)  Assessment & Plan  - Discussed diet and lifestyle modifications with patient  - Patient will need to follow up with PCP for outpatient management       Sepsis ([facility])  Assessment & Plan  This is Sepsis Present on admission  SIRS criteria identified on my evaluation include: Tachycardia, with heart rate greater than 90 BPM and Leukocytosis, with WBC greater than 12,000  Source is skin  Sepsis protocol initiated  Fluid resuscitation ordered per protocol  IV antibiotics as appropriate for source of sepsis  While organ dysfunction may be noted elsewhere in this problem list or in the chart, degree of organ dysfunction does not meet CMS criteria for severe sepsis  Improving             VTE prophylaxis: lovenox

## 2021-06-22 NOTE — PROGRESS NOTES
Report received from day RN. Patient resting in bed with R leg elevated on pillows. He denies pain or needs at this time. Bed in the lowest locked position, call light in reach.

## 2021-06-23 ENCOUNTER — NON-PROVIDER VISIT (OUTPATIENT)
Dept: WOUND CARE | Facility: MEDICAL CENTER | Age: 56
End: 2021-06-23
Attending: NURSE PRACTITIONER
Payer: COMMERCIAL

## 2021-06-23 VITALS
HEART RATE: 81 BPM | TEMPERATURE: 98.5 F | OXYGEN SATURATION: 92 % | WEIGHT: 315 LBS | HEIGHT: 72 IN | RESPIRATION RATE: 18 BRPM | DIASTOLIC BLOOD PRESSURE: 76 MMHG | BODY MASS INDEX: 42.66 KG/M2 | SYSTOLIC BLOOD PRESSURE: 131 MMHG

## 2021-06-23 PROBLEM — L03.115 CELLULITIS OF RIGHT LOWER EXTREMITY: Status: RESOLVED | Noted: 2021-06-20 | Resolved: 2021-06-23

## 2021-06-23 PROBLEM — A41.9 SEPSIS (HCC): Status: RESOLVED | Noted: 2021-06-20 | Resolved: 2021-06-23

## 2021-06-23 LAB
ANION GAP SERPL CALC-SCNC: 11 MMOL/L (ref 7–16)
BUN SERPL-MCNC: 9 MG/DL (ref 8–22)
CALCIUM SERPL-MCNC: 8.5 MG/DL (ref 8.4–10.2)
CHLORIDE SERPL-SCNC: 100 MMOL/L (ref 96–112)
CO2 SERPL-SCNC: 26 MMOL/L (ref 20–33)
CREAT SERPL-MCNC: 0.69 MG/DL (ref 0.5–1.4)
ERYTHROCYTE [DISTWIDTH] IN BLOOD BY AUTOMATED COUNT: 47.2 FL (ref 35.9–50)
GLUCOSE SERPL-MCNC: 103 MG/DL (ref 65–99)
HCT VFR BLD AUTO: 42.3 % (ref 42–52)
HGB BLD-MCNC: 13.4 G/DL (ref 14–18)
MCH RBC QN AUTO: 30.2 PG (ref 27–33)
MCHC RBC AUTO-ENTMCNC: 31.7 G/DL (ref 33.7–35.3)
MCV RBC AUTO: 95.3 FL (ref 81.4–97.8)
MRSA DNA SPEC QL NAA+PROBE: NORMAL
PLATELET # BLD AUTO: 316 K/UL (ref 164–446)
PMV BLD AUTO: 9.8 FL (ref 9–12.9)
POTASSIUM SERPL-SCNC: 4.2 MMOL/L (ref 3.6–5.5)
RBC # BLD AUTO: 4.44 M/UL (ref 4.7–6.1)
SIGNIFICANT IND 70042: NORMAL
SITE SITE: NORMAL
SODIUM SERPL-SCNC: 137 MMOL/L (ref 135–145)
SOURCE SOURCE: NORMAL
WBC # BLD AUTO: 9.1 K/UL (ref 4.8–10.8)

## 2021-06-23 PROCEDURE — 700111 HCHG RX REV CODE 636 W/ 250 OVERRIDE (IP): Performed by: INTERNAL MEDICINE

## 2021-06-23 PROCEDURE — 80048 BASIC METABOLIC PNL TOTAL CA: CPT

## 2021-06-23 PROCEDURE — A9270 NON-COVERED ITEM OR SERVICE: HCPCS | Performed by: INTERNAL MEDICINE

## 2021-06-23 PROCEDURE — 85027 COMPLETE CBC AUTOMATED: CPT

## 2021-06-23 PROCEDURE — 99239 HOSP IP/OBS DSCHRG MGMT >30: CPT | Performed by: INTERNAL MEDICINE

## 2021-06-23 PROCEDURE — 36415 COLL VENOUS BLD VENIPUNCTURE: CPT

## 2021-06-23 PROCEDURE — 700102 HCHG RX REV CODE 250 W/ 637 OVERRIDE(OP): Performed by: INTERNAL MEDICINE

## 2021-06-23 PROCEDURE — 99211 OFF/OP EST MAY X REQ PHY/QHP: CPT

## 2021-06-23 RX ADMIN — LINEZOLID 600 MG: 600 TABLET, FILM COATED ORAL at 05:36

## 2021-06-23 RX ADMIN — ENOXAPARIN SODIUM 40 MG: 40 INJECTION SUBCUTANEOUS at 05:36

## 2021-06-23 RX ADMIN — AMOXICILLIN 500 MG: 250 CAPSULE ORAL at 05:36

## 2021-06-23 RX ADMIN — ACETAMINOPHEN 650 MG: 325 TABLET, FILM COATED ORAL at 00:33

## 2021-06-23 ASSESSMENT — PAIN DESCRIPTION - PAIN TYPE: TYPE: ACUTE PAIN

## 2021-06-23 NOTE — PATIENT INSTRUCTIONS
-Keep dressings clean and dry. Change dressings if they become over saturated, soiled or fall off.     -Avoid prolonged standing or sitting without elevating your legs.    -Remove your compression garments if you have severe pain, severe swelling, numbness, color change, or temperature change in your toes. If you need to remove your compression garments, do so by unrolling them. Do not cut the compression garments off, this is to prevent cutting yourself on accident.    -Should you experience any significant changes in your wound, such as signs of infection (redness, swelling, localized heat, increased pain, fever > 101 F, chills) or have any questions regarding your home care instructions, please contact the wound center at [phone]. If after hours, contact your primary care physician or go to the hospital emergency room.

## 2021-06-23 NOTE — CARE PLAN
Problem: Hemodynamics  Goal: Patient's hemodynamics, fluid balance and neurologic status will be stable or improve  Outcome: Progressing     Problem: Urinary - Renal Perfusion  Goal: Ability to achieve and maintain adequate renal perfusion and functioning will improve  Outcome: Progressing   The patient is Stable - Low risk of patient condition declining or worsening    Shift Goals  Clinical Goals: pain control  Patient Goals: Pain Control, Oral Care    Progress made toward(s) clinical / shift goals:  Pt encouraged to drink fluids, assessed how often patient urinated. Pain assessed, medicated per MAR.     Patient is not progressing towards the following goals: N/A

## 2021-06-23 NOTE — PROGRESS NOTES
Assumed care of pt. A&O x4, pain assessed, pt denies nausea. RLE red and warm. Discussed POC. No other needs at this time. Bed low and locked.

## 2021-06-23 NOTE — DISCHARGE PLANNING
Anticipated Discharge Disposition: D/C to Home with OP Wound Care    Action: DONNAW updated RN regarding d/c plan for pt and requested pt d/c early to ensure he gets to his wound clinic appt on time. Address for [facility] was provided, pt's appt is at 1030 this AM.    Barriers to Discharge: None.    Plan: No further d/c planning needs anticipated at this time.

## 2021-06-23 NOTE — PROGRESS NOTES
"0720 Assumed care of pt. Pt sleeping comfortably.    0820 Pt A&O x4, reports tolerable pain, denies nausea, numbness/tingling. Pt reports swelling \"looks better\". Discussed importance of keeping follow up appts. Pt verbalized understanding. Reviewed discharge instructions with pt. Removed IV.     0900 Pt escorted down to ED entrance in WC with transport.   "

## 2021-06-23 NOTE — CERTIFICATION
Non Provider Encounter- Lower Extremity Ulcer    HISTORY OF PRESENT ILLNESS  Wound History:  START OF CARE IN CLINIC: 6/23/2021  REFERRING PROVIDER: LULU Love    WOUND ETIOLOGY: Trauma, possible venous component.  LOCATION: Right posterolateral lower leg    HISTORY: Patient is a 56 year old male with a right posterolateral lower leg wound. He states that about six months ago, he hit his leg on a tree branch. He was treating the wound at home himself until 6/18/2021 when he went to urgent care for nausea, fever, leg swelling, leg discoloration, and wound drainage. He was prescribed antibiotics at that time and sent home. Two days later (6/20/2021), he presented to [facility] ER for a wound recheck, and was admitted for cellulitis and IV antibiotic therapy. He was discharged today (6/23/2021) with prescriptions for Zyvox and Amoxicillin. He will  his antibiotics after today's appointment.    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 He is using a walker today due to knee pain, and he was advised by his hospitalist to stay off of his regular pain medication (Ibuprofen) while he is taking Zyvox and Amoxicillin.    Pertinent Medical History:  ETIOLOGY HISTORY:   Diagnosed: N/A.   Vascular Surgeon: N/A.   Previous vascular procedures: N/A.   Compression: tubigrip.   Varicose Veins: No      TOBACCO USE: Never smoker    FALL RISK ASSESSMENT 6/23/2021:       65 years or older        Fall within the last 2 years   X  Uses ambulatory devices     Loss of protective sensation in feet      Use of prostethic/orthotic       Presence of lower extremity/foot/toe amputation      Taking medication that increases risk (per facility policy)    Interventions Recommended (if any of the above are selected):   Use of Assistive Device: Walker   Supervision with ambulation: Independent   Assistance with ambulation: Independent      MOST RECENT VASCULAR STUDIES:  6/18/2021  FINDINGS:     Limited evaluation of the right peroneal vein.     There is no evidence of luminal thrombus.  There is normal augmentation to flow and respiratory variation.     IMPRESSION:        1. No evidence of right lower extremity deep venous thrombosis.    Patient allergies and medications reviewed via [facility] 6/23/2021.     WOUND ASSESSMENT   Wound 06/21/21 Venous Ulcer Leg Lateral Right (Active)   Wound Image   06/23/21 1030   Site Assessment Red;Granulation tissue 06/23/21 1030   Periwound Assessment Hemosiderin Staining;Edema 06/23/21 1030   Margins Attached edges 06/23/21 1030   Drainage Amount Moderate 06/23/21 1030   Drainage Description Serosanguineous 06/23/21 1030   Treatments Cleansed 06/23/21 1030   Wound Cleansing Normal Saline Irrigation 06/23/21 1030   Periwound Protectant Skin Protectant Wipes to Periwound 06/23/21 1030   Dressing Options Dry Gauze;Hypafix Tape;Tubigrip 06/23/21 1030   Dressing Changed Changed 06/23/21 1030   Dressing Change/Treatment Frequency Every 72 hrs, and As Needed 06/23/21 1030   Non-staged Wound Description Full thickness 06/23/21 1030   Wound Length (cm) 1.5 cm 06/23/21 1030   Wound Width (cm) 1.2 cm 06/23/21 1030   Wound Depth (cm) 0.2 cm 06/23/21 1030   Wound Surface Area (cm^2) 1.8 cm^2 06/23/21 1030   Wound Volume (cm^3) 0.36 cm^3 06/23/21 1030   Wound Healing % -64 06/23/21 1030   Wound Bed Granulation (%) 100 % 06/23/21 1030   Wound Bed Slough (%) 0 % 06/23/21 1030   Wound Bed Eschar (%) 0 % 06/23/21 1030   Tunneling (cm) 0 cm 06/23/21 1030   Undermining (cm) 0 cm 06/23/21 1030   Wound Odor None 06/23/21 1030   Pulses DP;PT;2+;Doppler 06/23/21 1030   Exposed Structures None 06/23/21 1030     Procedures:      -No debridement today, debridement was performed at [facility] yesterday 6/22/2021.  -Refer to flowsheet for wound care details.     PLAN OF CARE DISCUSSION:  -Patient states that he needs to go home and take a shower, so he does not want a  compression wrap applied today. Possible use of compression wrap will be discussed at next week's visit. Patient will apply dressings (Hydrofera Blue Ready, silicone adhesive foam, tubigrip F) after his shower today.   -Advised patient that venous study to evaluate for reflux may be ordered if his wound does not make any progress in the next 2-3 weeks.        PATIENT EDUCATION   - Etiology of venous ulceration discussed with patient  - Importance of managing edema for healing of ulcer, and for prevention of new ulcer development   -Elevate legs above the level of the heart periodically throughout the day.  - Importance of adequate nutrition for wound healing  -Advised to go to ER for any increased redness, swelling, drainage or odor, or if patient develops fever, chills, nausea or vomiting.

## 2021-06-23 NOTE — DISCHARGE INSTRUCTIONS
Cellulitis, Adult    Cellulitis is a skin infection. The infected area is often warm, red, swollen, and sore. It occurs most often in the arms and lower legs. It is very important to get treated for this condition.  What are the causes?  This condition is caused by bacteria. The bacteria enter through a break in the skin, such as a cut, burn, insect bite, open sore, or crack.  What increases the risk?  This condition is more likely to occur in people who:  · Have a weak body defense system (immune system).  · Have open cuts, burns, bites, or scrapes on the skin.  · Are older than 60 years of age.  · Have a blood sugar problem (diabetes).  · Have a long-lasting (chronic) liver disease (cirrhosis) or kidney disease.  · Are very overweight (obese).  · Have a skin problem, such as:  ? Itchy rash (eczema).  ? Slow movement of blood in the veins (venous stasis).  ? Fluid buildup below the skin (edema).  · Have been treated with high-energy rays (radiation).  · Use IV drugs.  What are the signs or symptoms?  Symptoms of this condition include:  · Skin that is:  ? Red.  ? Streaking.  ? Spotting.  ? Swollen.  ? Sore or painful when you touch it.  ? Warm.  · A fever.  · Chills.  · Blisters.  How is this diagnosed?  This condition is diagnosed based on:  · Medical history.  · Physical exam.  · Blood tests.  · Imaging tests.  How is this treated?  Treatment for this condition may include:  · Medicines to treat infections or allergies.  · Home care, such as:  ? Rest.  ? Placing cold or warm cloths (compresses) on the skin.  · Hospital care, if the condition is very bad.  Follow these instructions at home:  Medicines  · Take over-the-counter and prescription medicines only as told by your doctor.  · If you were prescribed an antibiotic medicine, take it as told by your doctor. Do not stop taking it even if you start to feel better.  General instructions    · Drink enough fluid to keep your pee (urine) pale yellow.  · Do not touch  or rub the infected area.  · Raise (elevate) the infected area above the level of your heart while you are sitting or lying down.  · Place cold or warm cloths on the area as told by your doctor.  · Keep all follow-up visits as told by your doctor. This is important.  Contact a doctor if:  · You have a fever.  · You do not start to get better after 1-2 days of treatment.  · Your bone or joint under the infected area starts to hurt after the skin has healed.  · Your infection comes back. This can happen in the same area or another area.  · You have a swollen bump in the area.  · You have new symptoms.  · You feel ill and have muscle aches and pains.  Get help right away if:  · Your symptoms get worse.  · You feel very sleepy.  · You throw up (vomit) or have watery poop (diarrhea) for a long time.  · You see red streaks coming from the area.  · Your red area gets larger.  · Your red area turns dark in color.  These symptoms may represent a serious problem that is an emergency. Do not wait to see if the symptoms will go away. Get medical help right away. Call your local emergency services (911 in the [place] Do not drive yourself to the hospital.  Summary  · Cellulitis is a skin infection. The area is often warm, red, swollen, and sore.  · This condition is treated with medicines, rest, and cold and warm cloths.  · Take all medicines only as told by your doctor.  · Tell your doctor if symptoms do not start to get better after 1-2 days of treatment.  This information is not intended to replace advice given to you by your health care provider. Make sure you discuss any questions you have with your health care provider.  Document Released: 06/05/2009 Document Revised: 05/09/2019 Document Reviewed: 05/09/2019  [employer] Patient Education © 2020 [employer]        Discharge Instructions    Discharged to home by car with relative. Discharged via wheelchair, hospital escort: Yes.  Special equipment needed: Not Applicable    Be  sure to schedule a follow-up appointment with your primary care doctor or any specialists as instructed.     Discharge Plan:        I understand that a diet low in cholesterol, fat, and sodium is recommended for good health. Unless I have been given specific instructions below for another diet, I accept this instruction as my diet prescription.   Other diet: Reg    Special Instructions: None    · Is patient discharged on Warfarin / Coumadin?   No     Depression / Suicide Risk    As you are discharged from this [facility] facility, it is important to learn how to keep safe from harming yourself.    Recognize the warning signs:  · Abrupt changes in personality, positive or negative- including increase in energy   · Giving away possessions  · Change in eating patterns- significant weight changes-  positive or negative  · Change in sleeping patterns- unable to sleep or sleeping all the time   · Unwillingness or inability to communicate  · Depression  · Unusual sadness, discouragement and loneliness  · Talk of wanting to die  · Neglect of personal appearance   · Rebelliousness- reckless behavior  · Withdrawal from people/activities they love  · Confusion- inability to concentrate     If you or a loved one observes any of these behaviors or has concerns about self-harm, here's what you can do:  · Talk about it- your feelings and reasons for harming yourself  · Remove any means that you might use to hurt yourself (examples: pills, rope, extension cords, firearm)  · Get professional help from the community (Mental Health, Substance Abuse, psychological counseling)  · Do not be alone:Call your Safe Contact- someone whom you trust who will be there for you.  · Call your local CRISIS HOTLINE 492-2951 or 157-926-6852  · Call your local Children's Mobile Crisis Response Team [place] (937) 230-1475 or www.[employer]  · Call the toll free National Suicide Prevention Hotlines   · National Suicide Prevention Lifeline  620-132-ATUD (2579)  · National [place] [employer] Network 800-SUICIDE (955-5959)

## 2021-06-24 NOTE — DISCHARGE SUMMARY
"Discharge Summary    CHIEF COMPLAINT ON ADMISSION  Chief Complaint   Patient presents with   • Wound Infection   • Leg Pain       Reason for Admission  Right Leg Pain     Admission Date  6/20/2021    CODE STATUS  Prior    HPI & HOSPITAL COURSE  Per notes, \"56 y.o. male who presented 6/20/2021 with prior right ankle fracture s/p surgery who had puncture wound 6 months ago. He's has been taking care of his wound on his own, his wife also has chronic wounds that he cares for. For the past several days he's noticed increased edema, erythema, and purulent drainage from the wound. On 5 days ago he had F/C, headache and 2 episodes of diarrhea. He went to clinic 2 days ago and prescribed oral antibiotics which he started but he didn't feel well and came to the ED. He did have doppler that was neg for DVT\"    Patient was admitted and treated with IV antibiotics for cellulitis and chronic venous stasis dermatitis.  He did have a small chronic nonhealing wound on right lower extremity.  He was evaluated by wound care, recommended follow-up in the outpatient setting as well.  Wound cultures grew polymicrobial infection and MRSA.  He had previously failed doxycycline in outpatient setting, which should have treated staph areas.  Antibiotics were discussed with pharmacy, who recommended to complete course of treatment with linezolid and amoxicillin based on cultures.  Patient still had some swelling and erythema, however her pictures had improved.  I did explain to the patient that due to chronic venous stasis dermatitis he is at increased risk for repeat infections, but current infection is being treated according to guidelines and showing signs of improvement.  I also explained that the swelling, chronic skin changes and dermatitis will need to be closely followed and managed in the outpatient setting and is a chronic process.  Patient did have some weakness, largely due to chronic right knee pain, exacerbated by cellulitis " process.  A walker was arranged for the patient.  Outpatient wound care was arranged for the patient.  I recommended he follow-up with PCP in 1 to 2 weeks.    Therefore, he is discharged in good and stable condition to home with close outpatient follow-up.    The patient recovered much more quickly than anticipated on admission.    Discharge Date  6/23/2021    FOLLOW UP ITEMS POST DISCHARGE  Follow-up with PCP  Follow-up with wound care    DISCHARGE DIAGNOSES  Principal Problem (Resolved):    Cellulitis of right lower extremity POA: Unknown  Active Problems:    Class 3 severe obesity in adult (HCC) POA: Yes    Chronic venous stasis dermatitis of both lower extremities POA: Yes    Hyponatremia POA: Clinically Undetermined  Resolved Problems:    Sepsis ([facility]) POA: Unknown      FOLLOW UP  Future Appointments   Date Time Provider Department Center   7/1/2021  4:00 PM ADRIANNA Horne [address] [place] [address]   7/8/2021  4:30 PM ADRIANNA Stern [address] [place] [address]   7/15/2021  4:00 PM ADRIANNA Horne [address] [place] [address] [place]  [address] [place] [address] [address]  [address]  [facility] [id number]  [phone]    Schedule an appointment as soon as possible for a visit  For wound re-check      MEDICATIONS ON DISCHARGE     Medication List      START taking these medications      Instructions   amoxicillin 500 MG Caps  Commonly known as: AMOXIL   Take 1 capsule by mouth 3 times a day for 2 days.  Dose: 500 mg     linezolid 600 MG Tabs  Commonly known as: ZYVOX   Take 1 tablet by mouth 2 times a day for 2 days.  Dose: 600 mg        CONTINUE taking these medications      Instructions   Aleve 220 MG tablet  Generic drug: naproxen   Take 220 mg by mouth 2 times daily with meals as needed (inflammation).  Dose: 220 mg     glucosamine 500 MG Caps   Take 500 mg by mouth every day.  Dose: 500 mg        STOP taking these medications    acetaminophen 500 MG Tabs  Commonly known as: TYLENOL     amoxicillin-clavulanate 875-125 MG Tabs  Commonly known  as: AUGMENTIN     doxycycline 100 MG Tabs  Commonly known as: VIBRAMYCIN            Allergies  No Known Allergies    DIET  No orders of the defined types were placed in this encounter.      ACTIVITY  As tolerated.  Weight bearing as tolerated    CONSULTATIONS  Wound care    PROCEDURES  None    LABORATORY  Lab Results   Component Value Date    SODIUM 137 06/23/2021    POTASSIUM 4.2 06/23/2021    CHLORIDE 100 06/23/2021    CO2 26 06/23/2021    GLUCOSE 103 (H) 06/23/2021    BUN 9 06/23/2021    CREATININE 0.69 06/23/2021        Lab Results   Component Value Date    WBC 9.1 06/23/2021    HEMOGLOBIN 13.4 (L) 06/23/2021    HEMATOCRIT 42.3 06/23/2021    PLATELETCT 316 06/23/2021        Total time of the discharge process exceeds 35 minutes.

## 2021-06-25 LAB
BACTERIA BLD CULT: NORMAL
BACTERIA BLD CULT: NORMAL
SIGNIFICANT IND 70042: NORMAL
SIGNIFICANT IND 70042: NORMAL
SITE SITE: NORMAL
SITE SITE: NORMAL
SOURCE SOURCE: NORMAL
SOURCE SOURCE: NORMAL

## 2021-07-01 ENCOUNTER — OFFICE VISIT (OUTPATIENT)
Dept: WOUND CARE | Facility: MEDICAL CENTER | Age: 56
End: 2021-07-01
Attending: NURSE PRACTITIONER
Payer: COMMERCIAL

## 2021-07-01 DIAGNOSIS — S81.801A OPEN WOUND OF RIGHT LOWER LEG, INITIAL ENCOUNTER: ICD-10-CM

## 2021-07-01 PROCEDURE — 99211 OFF/OP EST MAY X REQ PHY/QHP: CPT

## 2021-07-01 NOTE — CERTIFICATION
[facility]   [facility]   [address]   [address]   [place] [address]   [phone] Fax: [phone]    Discharge Note      Referring Physician: LULU Love  Wound Etiology: Trauma, possible venous component   Wound location: Right posterolateral lower leg  Date of Discharge: 7/1/2021    Assessment:  Discharge patient at this time secondary to wound resolution.    Thank you for the referral and the opportunity to treat your patient.

## 2021-07-01 NOTE — PATIENT INSTRUCTIONS
- Resolved wound be fragile for a few days, bathe and dry area gently, only ever regains a maximum of 80% of the tensile strength of the surrounding skin, remodeling of scar can continue for 6mo - a year. Contact PCP for a referral back her if any problems with area opening and draining again.    -Should you experience any significant changes in your wound(s), such as infection (redness, swelling, localized heat, increased pain, fever > 101 F, chills) or have any questions regarding your home care instructions, please contact the wound center at [phone]. If after hours, contact your primary care physician or go to the hospital emergency room.

## 2022-03-15 ENCOUNTER — APPOINTMENT (OUTPATIENT)
Dept: MEDICAL GROUP | Facility: OTHER | Age: 57
End: 2022-03-15
Payer: COMMERCIAL

## 2023-06-07 ENCOUNTER — HOSPITAL ENCOUNTER (OUTPATIENT)
Dept: LAB | Facility: MEDICAL CENTER | Age: 58
End: 2023-06-07
Attending: FAMILY MEDICINE
Payer: COMMERCIAL

## 2023-06-07 LAB
ALBUMIN SERPL BCP-MCNC: 4.1 G/DL (ref 3.2–4.9)
ALBUMIN/GLOB SERPL: 1.1 G/DL
ALP SERPL-CCNC: 44 U/L (ref 30–99)
ALT SERPL-CCNC: 46 U/L (ref 2–50)
ANION GAP SERPL CALC-SCNC: 12 MMOL/L (ref 7–16)
AST SERPL-CCNC: 23 U/L (ref 12–45)
BILIRUB SERPL-MCNC: 0.7 MG/DL (ref 0.1–1.5)
BUN SERPL-MCNC: 18 MG/DL (ref 8–22)
CALCIUM ALBUM COR SERPL-MCNC: 9.5 MG/DL (ref 8.5–10.5)
CALCIUM SERPL-MCNC: 9.6 MG/DL (ref 8.4–10.2)
CHLORIDE SERPL-SCNC: 99 MMOL/L (ref 96–112)
CHOLEST SERPL-MCNC: 177 MG/DL (ref 100–199)
CO2 SERPL-SCNC: 26 MMOL/L (ref 20–33)
CREAT SERPL-MCNC: 0.76 MG/DL (ref 0.5–1.4)
EST. AVERAGE GLUCOSE BLD GHB EST-MCNC: 148 MG/DL
FASTING STATUS PATIENT QL REPORTED: NORMAL
GFR SERPLBLD CREATININE-BSD FMLA CKD-EPI: 103 ML/MIN/1.73 M 2
GLOBULIN SER CALC-MCNC: 3.7 G/DL (ref 1.9–3.5)
GLUCOSE SERPL-MCNC: 103 MG/DL (ref 65–99)
HBA1C MFR BLD: 6.8 % (ref 4–5.6)
HDLC SERPL-MCNC: 42 MG/DL
LDLC SERPL CALC-MCNC: 112 MG/DL
POTASSIUM SERPL-SCNC: 4.6 MMOL/L (ref 3.6–5.5)
PROT SERPL-MCNC: 7.8 G/DL (ref 6–8.2)
PSA SERPL-MCNC: 0.45 NG/ML (ref 0–4)
SODIUM SERPL-SCNC: 137 MMOL/L (ref 135–145)
TRIGL SERPL-MCNC: 115 MG/DL (ref 0–149)

## 2023-06-07 PROCEDURE — 83036 HEMOGLOBIN GLYCOSYLATED A1C: CPT

## 2023-06-07 PROCEDURE — 80061 LIPID PANEL: CPT

## 2023-06-07 PROCEDURE — 80053 COMPREHEN METABOLIC PANEL: CPT

## 2023-06-07 PROCEDURE — 36415 COLL VENOUS BLD VENIPUNCTURE: CPT

## 2023-06-07 PROCEDURE — 84153 ASSAY OF PSA TOTAL: CPT

## 2023-09-25 ENCOUNTER — APPOINTMENT (OUTPATIENT)
Dept: ADMISSIONS | Facility: MEDICAL CENTER | Age: 58
End: 2023-09-25
Attending: INTERNAL MEDICINE
Payer: COMMERCIAL

## 2023-09-26 ENCOUNTER — HOSPITAL ENCOUNTER (OUTPATIENT)
Dept: LAB | Facility: MEDICAL CENTER | Age: 58
End: 2023-09-26
Attending: FAMILY MEDICINE
Payer: COMMERCIAL

## 2023-09-26 LAB
ALBUMIN SERPL BCP-MCNC: 4.3 G/DL (ref 3.2–4.9)
ALBUMIN/GLOB SERPL: 1.3 G/DL
ALP SERPL-CCNC: 42 U/L (ref 30–99)
ALT SERPL-CCNC: 43 U/L (ref 2–50)
ANION GAP SERPL CALC-SCNC: 11 MMOL/L (ref 7–16)
AST SERPL-CCNC: 33 U/L (ref 12–45)
BILIRUB SERPL-MCNC: 0.6 MG/DL (ref 0.1–1.5)
BUN SERPL-MCNC: 13 MG/DL (ref 8–22)
CALCIUM ALBUM COR SERPL-MCNC: 9.5 MG/DL (ref 8.5–10.5)
CALCIUM SERPL-MCNC: 9.7 MG/DL (ref 8.5–10.5)
CHLORIDE SERPL-SCNC: 97 MMOL/L (ref 96–112)
CHOLEST SERPL-MCNC: 123 MG/DL (ref 100–199)
CO2 SERPL-SCNC: 27 MMOL/L (ref 20–33)
CREAT SERPL-MCNC: 0.86 MG/DL (ref 0.5–1.4)
CREAT UR-MCNC: 151.36 MG/DL
EST. AVERAGE GLUCOSE BLD GHB EST-MCNC: 148 MG/DL
FASTING STATUS PATIENT QL REPORTED: NORMAL
GFR SERPLBLD CREATININE-BSD FMLA CKD-EPI: 100 ML/MIN/1.73 M 2
GLOBULIN SER CALC-MCNC: 3.4 G/DL (ref 1.9–3.5)
GLUCOSE SERPL-MCNC: 90 MG/DL (ref 65–99)
HBA1C MFR BLD: 6.8 % (ref 4–5.6)
HDLC SERPL-MCNC: 42 MG/DL
LDLC SERPL CALC-MCNC: 62 MG/DL
MICROALBUMIN UR-MCNC: 1.3 MG/DL
MICROALBUMIN/CREAT UR: 9 MG/G (ref 0–30)
POTASSIUM SERPL-SCNC: 4.8 MMOL/L (ref 3.6–5.5)
PROT SERPL-MCNC: 7.7 G/DL (ref 6–8.2)
SODIUM SERPL-SCNC: 135 MMOL/L (ref 135–145)
TRIGL SERPL-MCNC: 96 MG/DL (ref 0–149)

## 2023-09-26 PROCEDURE — 82570 ASSAY OF URINE CREATININE: CPT

## 2023-09-26 PROCEDURE — 36415 COLL VENOUS BLD VENIPUNCTURE: CPT

## 2023-09-26 PROCEDURE — 80061 LIPID PANEL: CPT

## 2023-09-26 PROCEDURE — 82043 UR ALBUMIN QUANTITATIVE: CPT

## 2023-09-26 PROCEDURE — 80053 COMPREHEN METABOLIC PANEL: CPT

## 2023-09-26 PROCEDURE — 83036 HEMOGLOBIN GLYCOSYLATED A1C: CPT

## 2023-10-02 ENCOUNTER — PRE-ADMISSION TESTING (OUTPATIENT)
Dept: ADMISSIONS | Facility: MEDICAL CENTER | Age: 58
End: 2023-10-02
Attending: INTERNAL MEDICINE
Payer: COMMERCIAL

## 2023-10-02 VITALS — HEIGHT: 72 IN | BODY MASS INDEX: 46.11 KG/M2

## 2023-10-02 RX ORDER — ATORVASTATIN CALCIUM 10 MG/1
10 TABLET, FILM COATED ORAL DAILY
COMMUNITY

## 2023-10-02 RX ORDER — LISINOPRIL AND HYDROCHLOROTHIAZIDE 20; 12.5 MG/1; MG/1
1 TABLET ORAL DAILY
COMMUNITY

## 2023-10-02 NOTE — PREPROCEDURE INSTRUCTIONS
"Pre-admit appointment completed.  \"Preparing for your procedure\" reviewed with pt along with verbal and written instructions.  Patient instructed per pharmacy guidelines regarding taking or holding regularly prescribed medications before surgery.  Instructed to take the following medications the day of surgery with a sip of water per pharmacy medication guidelines:   atorvastatin  "

## 2023-10-11 ENCOUNTER — APPOINTMENT (OUTPATIENT)
Dept: ADMISSIONS | Facility: MEDICAL CENTER | Age: 58
End: 2023-10-11
Attending: INTERNAL MEDICINE
Payer: COMMERCIAL

## 2023-10-11 DIAGNOSIS — Z01.810 PRE-OPERATIVE CARDIOVASCULAR EXAMINATION: ICD-10-CM

## 2023-10-11 LAB — EKG IMPRESSION: NORMAL

## 2023-10-11 PROCEDURE — 93010 ELECTROCARDIOGRAM REPORT: CPT | Performed by: INTERNAL MEDICINE

## 2023-10-11 PROCEDURE — 93005 ELECTROCARDIOGRAM TRACING: CPT

## 2023-10-31 ENCOUNTER — HOSPITAL ENCOUNTER (OUTPATIENT)
Facility: MEDICAL CENTER | Age: 58
End: 2023-10-31
Attending: INTERNAL MEDICINE | Admitting: INTERNAL MEDICINE
Payer: COMMERCIAL

## 2023-10-31 ENCOUNTER — ANESTHESIA (OUTPATIENT)
Dept: SURGERY | Facility: MEDICAL CENTER | Age: 58
End: 2023-10-31
Payer: COMMERCIAL

## 2023-10-31 ENCOUNTER — ANESTHESIA EVENT (OUTPATIENT)
Dept: SURGERY | Facility: MEDICAL CENTER | Age: 58
End: 2023-10-31
Payer: COMMERCIAL

## 2023-10-31 VITALS
HEIGHT: 72 IN | DIASTOLIC BLOOD PRESSURE: 76 MMHG | RESPIRATION RATE: 18 BRPM | OXYGEN SATURATION: 94 % | BODY MASS INDEX: 42.66 KG/M2 | HEART RATE: 83 BPM | TEMPERATURE: 97.9 F | SYSTOLIC BLOOD PRESSURE: 145 MMHG | WEIGHT: 315 LBS

## 2023-10-31 LAB — GLUCOSE BLD STRIP.AUTO-MCNC: 93 MG/DL (ref 65–99)

## 2023-10-31 PROCEDURE — 160203 HCHG ENDO MINUTES - 1ST 30 MINS LEVEL 4: Performed by: INTERNAL MEDICINE

## 2023-10-31 PROCEDURE — 82962 GLUCOSE BLOOD TEST: CPT

## 2023-10-31 PROCEDURE — 160046 HCHG PACU - 1ST 60 MINS PHASE II: Performed by: INTERNAL MEDICINE

## 2023-10-31 PROCEDURE — 700111 HCHG RX REV CODE 636 W/ 250 OVERRIDE (IP): Mod: JZ | Performed by: ANESTHESIOLOGY

## 2023-10-31 PROCEDURE — 700105 HCHG RX REV CODE 258: Performed by: INTERNAL MEDICINE

## 2023-10-31 PROCEDURE — 160035 HCHG PACU - 1ST 60 MINS PHASE I: Performed by: INTERNAL MEDICINE

## 2023-10-31 PROCEDURE — 160048 HCHG OR STATISTICAL LEVEL 1-5: Performed by: INTERNAL MEDICINE

## 2023-10-31 PROCEDURE — 160002 HCHG RECOVERY MINUTES (STAT): Performed by: INTERNAL MEDICINE

## 2023-10-31 PROCEDURE — 160009 HCHG ANES TIME/MIN: Performed by: INTERNAL MEDICINE

## 2023-10-31 PROCEDURE — 700101 HCHG RX REV CODE 250: Performed by: ANESTHESIOLOGY

## 2023-10-31 PROCEDURE — 160025 RECOVERY II MINUTES (STATS): Performed by: INTERNAL MEDICINE

## 2023-10-31 RX ORDER — HYDRALAZINE HYDROCHLORIDE 20 MG/ML
5 INJECTION INTRAMUSCULAR; INTRAVENOUS
Status: DISCONTINUED | OUTPATIENT
Start: 2023-10-31 | End: 2023-10-31 | Stop reason: HOSPADM

## 2023-10-31 RX ORDER — ONDANSETRON 2 MG/ML
INJECTION INTRAMUSCULAR; INTRAVENOUS PRN
Status: DISCONTINUED | OUTPATIENT
Start: 2023-10-31 | End: 2023-10-31 | Stop reason: SURG

## 2023-10-31 RX ORDER — DIPHENHYDRAMINE HYDROCHLORIDE 50 MG/ML
12.5 INJECTION INTRAMUSCULAR; INTRAVENOUS
Status: DISCONTINUED | OUTPATIENT
Start: 2023-10-31 | End: 2023-10-31 | Stop reason: HOSPADM

## 2023-10-31 RX ORDER — HALOPERIDOL 5 MG/ML
1 INJECTION INTRAMUSCULAR
Status: DISCONTINUED | OUTPATIENT
Start: 2023-10-31 | End: 2023-10-31 | Stop reason: HOSPADM

## 2023-10-31 RX ORDER — IPRATROPIUM BROMIDE AND ALBUTEROL SULFATE 2.5; .5 MG/3ML; MG/3ML
3 SOLUTION RESPIRATORY (INHALATION)
Status: DISCONTINUED | OUTPATIENT
Start: 2023-10-31 | End: 2023-10-31 | Stop reason: HOSPADM

## 2023-10-31 RX ORDER — MEPERIDINE HYDROCHLORIDE 25 MG/ML
12.5 INJECTION INTRAMUSCULAR; INTRAVENOUS; SUBCUTANEOUS
Status: DISCONTINUED | OUTPATIENT
Start: 2023-10-31 | End: 2023-10-31 | Stop reason: HOSPADM

## 2023-10-31 RX ORDER — ONDANSETRON 2 MG/ML
4 INJECTION INTRAMUSCULAR; INTRAVENOUS
Status: DISCONTINUED | OUTPATIENT
Start: 2023-10-31 | End: 2023-10-31 | Stop reason: HOSPADM

## 2023-10-31 RX ORDER — METOPROLOL TARTRATE 1 MG/ML
1 INJECTION, SOLUTION INTRAVENOUS
Status: DISCONTINUED | OUTPATIENT
Start: 2023-10-31 | End: 2023-10-31 | Stop reason: HOSPADM

## 2023-10-31 RX ORDER — SODIUM CHLORIDE, SODIUM LACTATE, POTASSIUM CHLORIDE, CALCIUM CHLORIDE 600; 310; 30; 20 MG/100ML; MG/100ML; MG/100ML; MG/100ML
INJECTION, SOLUTION INTRAVENOUS CONTINUOUS
Status: DISCONTINUED | OUTPATIENT
Start: 2023-10-31 | End: 2023-10-31 | Stop reason: HOSPADM

## 2023-10-31 RX ORDER — EPHEDRINE SULFATE 50 MG/ML
5 INJECTION, SOLUTION INTRAVENOUS
Status: DISCONTINUED | OUTPATIENT
Start: 2023-10-31 | End: 2023-10-31 | Stop reason: HOSPADM

## 2023-10-31 RX ORDER — LABETALOL HYDROCHLORIDE 5 MG/ML
5 INJECTION, SOLUTION INTRAVENOUS
Status: DISCONTINUED | OUTPATIENT
Start: 2023-10-31 | End: 2023-10-31 | Stop reason: HOSPADM

## 2023-10-31 RX ORDER — LIDOCAINE HYDROCHLORIDE 20 MG/ML
INJECTION, SOLUTION EPIDURAL; INFILTRATION; INTRACAUDAL; PERINEURAL PRN
Status: DISCONTINUED | OUTPATIENT
Start: 2023-10-31 | End: 2023-10-31 | Stop reason: SURG

## 2023-10-31 RX ADMIN — SODIUM CHLORIDE, POTASSIUM CHLORIDE, SODIUM LACTATE AND CALCIUM CHLORIDE: 600; 310; 30; 20 INJECTION, SOLUTION INTRAVENOUS at 08:28

## 2023-10-31 RX ADMIN — FENTANYL CITRATE 100 MCG: 50 INJECTION, SOLUTION INTRAMUSCULAR; INTRAVENOUS at 08:32

## 2023-10-31 RX ADMIN — PROPOFOL 150 MCG/KG/MIN: 10 INJECTION, EMULSION INTRAVENOUS at 08:33

## 2023-10-31 RX ADMIN — LIDOCAINE HYDROCHLORIDE 100 MG: 20 INJECTION, SOLUTION EPIDURAL; INFILTRATION; INTRACAUDAL at 08:33

## 2023-10-31 RX ADMIN — ONDANSETRON 4 MG: 2 INJECTION INTRAMUSCULAR; INTRAVENOUS at 08:46

## 2023-10-31 RX ADMIN — PROPOFOL 150 MCG/KG/MIN: 10 INJECTION, EMULSION INTRAVENOUS at 08:38

## 2023-10-31 ASSESSMENT — PAIN SCALES - GENERAL: PAIN_LEVEL: 0

## 2023-10-31 NOTE — OP REPORT
DATE OF SERVICE:  10/31/2023     PROCEDURE:  Colonoscopy.     PREOPERATIVE DIAGNOSIS:  Screening colonoscopy.     POSTOPERATIVE DIAGNOSIS:  Normal colonoscopy.     ANESTHESIA:  Per anesthesiologist.     DESCRIPTION OF PROCEDURE:  After informed consent and appropriate sedation,   the patient was placed in left lateral position and an adult colonoscope was   advanced through to the rectum through to the cecum, confirmed by presence of   ileocecal valve and appendiceal orifice.  The prep was adequate.  The scope   was withdrawn and the mucosa was examined carefully.  The cecum, ascending,   transverse, descending, and sigmoid colon were all unremarkable as was the   rectum including on retroflexion.  The colon was decompressed.  The scope was   withdrawn.  There were no immediate postop complications.     RECOMMENDATIONS:  1.  Repeat colonoscopy in 5 years due to family history of colon cancer in the   patient's mother at a young age.  2.  Anticipate the patient will be discharged directly home today.  3.  Advance diet as tolerated.  4.  Follow up in the office with nurse practitioner as needed.        ______________________________  DO RAMIRO Dickerson    DD:  10/31/2023 08:47  DT:  10/31/2023 08:54    Job#:  [id number]

## 2023-10-31 NOTE — OR NURSING
3134 Pt arrived from PACU post colonoscopy , report received. Pt states pain is tollerable and denies nausea at this time. Pt dressing @ BS with assistance.    1004 Discharge instructions and medications gone over with Pt and ride. All questions answered. Pt meets DC criteria. PIV DC'd. Pt transported to POV via WC in stable condition.

## 2023-10-31 NOTE — OR NURSING
0848-To PACU from [facility] via [facility],  respirations spontaneous and non-labored.    Plan to keep pt in PACU for full hour per ST[facility]BANG protocol.     0900- Pt drinking water. Pt denies nausea and pain.    0915-Pt resting comfortably. No needs. Pt drinking fluids.     0930- Pt sitting drinking fluids. No needs at this time.     0945- Pt resting comfortably. No needs. Pt drinking fluids.     0948- Full hour stop band complete. Patient met criteria for transfer to stage II via [facility] with CNA. assist.  Patient denies pain. Denies N/V, tolerating PO well.  Report called to Karl SARMIENTO.

## 2023-10-31 NOTE — ANESTHESIA POSTPROCEDURE EVALUATION
Patient: Joni Stone    Procedure Summary     Date: 10/31/23 Room / Location:  ENDOSCOPIC ULTRASOUND ROOM / SURGERY [facility]    Anesthesia Start: 0828 Anesthesia Stop: 0854    Procedure: COLONOSCOPY Diagnosis:       Normal exam      (Normal exam [369073])    Surgeons: Adalberto Camacho D.O. Responsible Provider: Unruly Gonzales M.D.    Anesthesia Type: general ASA Status: 3          Final Anesthesia Type: general  Last vitals  BP   Blood Pressure: 106/64    Temp   36.4 °C (97.5 °F)    Pulse   86   Resp   16    SpO2   95 %      Anesthesia Post Evaluation    Patient location during evaluation: PACU  Patient participation: complete - patient participated  Level of consciousness: awake and alert  Pain score: 0    Airway patency: patent  Anesthetic complications: no  Cardiovascular status: hemodynamically stable  Respiratory status: acceptable  Hydration status: euvolemic    PONV: none          There were no known notable events for this encounter.     Nurse Pain Score: 0 (NPRS)

## 2023-10-31 NOTE — DISCHARGE INSTRUCTIONS
What to Expect Post Anesthesia    Rest and take it easy for the first 24 hours.  A responsible adult is recommended to remain with you during that time.  It is normal to feel sleepy.  We encourage you to not do anything that requires balance, judgment or coordination.    FOR 24 HOURS DO NOT:  Drive, operate machinery or run household appliances.  Drink beer or alcoholic beverages.  Make important decisions or sign legal documents.    To avoid nausea, slowly advance diet as tolerated, avoiding spicy or greasy foods for the first day.  Add more substantial food to your diet according to your provider's instructions.  Babies can be fed formula or breast milk as soon as they are hungry.  INCREASE FLUIDS AND FIBER TO AVOID CONSTIPATION.    MILD FLU-LIKE SYMPTOMS ARE NORMAL.  YOU MAY EXPERIENCE GENERALIZED MUSCLE ACHES, THROAT IRRITATION, HEADACHE AND/OR SOME NAUSEA.    If any questions arise, call your provider.  If your provider is not available, please feel free to call the Surgical Center at [phone].    MEDICATIONS: Resume taking daily medication.  Take prescribed pain medication with food.  If no medication is prescribed, you may take non-aspirin pain medication if needed.  PAIN MEDICATION CAN BE VERY CONSTIPATING.  Take a stool softener or laxative such as senokot, pericolace, or milk of magnesia if needed.    Diet    Resume your normal diet as tolerated.  A diet low in cholesterol, fat, and sodium is recommended for good health.     ENDOSCOPY HOME CARE INSTRUCTIONS      COLONOSCOPY OR FLEXIBLE SIGMOIDOSCOPY  1. If you received a barium enema, take a mild laxative such as dulcolax, Evangelina's M.O., or Milk of Magnesia to clean out the barium.  2. Drink plenty of fluids. Eat a diet high in fiber (whole-grain breads, fresh fruit and vegetables).  3. You may notice a few drops of blood with your first bowel movement. If you develop any large amount of bleeding, black stools, a fever, or abdominal pain, call your  doctor right away.  4. Don't drive or drink alcohol for 24 hours. The medication you received will make you too drowsy.    You should call 911 if you develop problems with breathing or chest pain.  If any questions arise, call your doctor. If your doctor is not available, please feel free to call [phone]. You can also call the HEALTH HOTLINE open 24 hours/day, 7 days/week and speak to a nurse at [phone], or toll free [phone].

## 2023-10-31 NOTE — ANESTHESIA TIME REPORT
Anesthesia Start and Stop Event Times     Date Time Event    10/31/2023 0826 Ready for Procedure     0828 Anesthesia Start     0854 Anesthesia Stop        Responsible Staff  10/31/23    Name Role Begin End    Unruly Gonzales M.D. Anesth 0828 0854        Overtime Reason:  no overtime (within assigned shift)    Comments:

## 2023-10-31 NOTE — ANESTHESIA PREPROCEDURE EVALUATION
Case: [id number] Date/Time: 10/31/23 0815    Procedure: COLONOSCOPY    Anesthesia type: MAC    Pre-op diagnosis: Z80.0 FAMILY HISTORY OF COLON CANCER    Location:  ENDOSCOPIC ULTRASOUND ROOM / SURGERY [facility]    Surgeons: Adalberto Camacho D.O.          Relevant Problems   No relevant active problems       Physical Exam    Airway   Mallampati: III  TM distance: >3 FB  Neck ROM: limited       Cardiovascular - normal exam  Rhythm: regular  Rate: normal  (-) murmur     Dental - normal exam           Pulmonary - normal exam  Breath sounds clear to auscultation     Abdominal   (+) obese     Neurological - normal exam                 Anesthesia Plan    ASA 3   ASA physical status 3 criteria: morbid obesity - BMI greater than or equal to 40    Plan - general       Airway plan will be mask          Induction: intravenous      Pertinent diagnostic labs and testing reviewed    Informed Consent:    Anesthetic plan and risks discussed with patient.    Use of blood products discussed with: patient whom consented to blood products.

## 2023-12-25 ENCOUNTER — APPOINTMENT (OUTPATIENT)
Dept: RADIOLOGY | Facility: MEDICAL CENTER | Age: 58
End: 2023-12-25
Attending: EMERGENCY MEDICINE
Payer: COMMERCIAL

## 2023-12-25 ENCOUNTER — HOSPITAL ENCOUNTER (EMERGENCY)
Facility: MEDICAL CENTER | Age: 58
End: 2023-12-25
Attending: EMERGENCY MEDICINE
Payer: COMMERCIAL

## 2023-12-25 VITALS
WEIGHT: 315 LBS | DIASTOLIC BLOOD PRESSURE: 74 MMHG | BODY MASS INDEX: 42.66 KG/M2 | RESPIRATION RATE: 20 BRPM | HEART RATE: 85 BPM | TEMPERATURE: 97.2 F | HEIGHT: 72 IN | SYSTOLIC BLOOD PRESSURE: 152 MMHG | OXYGEN SATURATION: 95 %

## 2023-12-25 DIAGNOSIS — M19.90 INFLAMMATORY ARTHRITIS: ICD-10-CM

## 2023-12-25 PROCEDURE — 700102 HCHG RX REV CODE 250 W/ 637 OVERRIDE(OP): Performed by: EMERGENCY MEDICINE

## 2023-12-25 PROCEDURE — 99283 EMERGENCY DEPT VISIT LOW MDM: CPT

## 2023-12-25 PROCEDURE — 73110 X-RAY EXAM OF WRIST: CPT | Mod: LT

## 2023-12-25 PROCEDURE — A9270 NON-COVERED ITEM OR SERVICE: HCPCS | Performed by: EMERGENCY MEDICINE

## 2023-12-25 RX ORDER — NAPROXEN 500 MG/1
500 TABLET ORAL 2 TIMES DAILY WITH MEALS
Qty: 60 TABLET | Refills: 0 | Status: SHIPPED | OUTPATIENT
Start: 2023-12-25

## 2023-12-25 RX ORDER — NAPROXEN 250 MG/1
500 TABLET ORAL ONCE
Status: COMPLETED | OUTPATIENT
Start: 2023-12-25 | End: 2023-12-25

## 2023-12-25 RX ORDER — FAMOTIDINE 20 MG/1
20 TABLET, FILM COATED ORAL ONCE
Status: COMPLETED | OUTPATIENT
Start: 2023-12-25 | End: 2023-12-25

## 2023-12-25 RX ORDER — FAMOTIDINE 40 MG/1
40 TABLET, FILM COATED ORAL DAILY
Qty: 30 TABLET | Refills: 0 | Status: SHIPPED | OUTPATIENT
Start: 2023-12-25

## 2023-12-25 RX ADMIN — FAMOTIDINE 20 MG: 20 TABLET ORAL at 10:16

## 2023-12-25 RX ADMIN — NAPROXEN 500 MG: 250 TABLET ORAL at 10:16

## 2023-12-25 NOTE — ED NOTES
Pt states he woke up on Thursday morning with left wrist pain after lifting something heavy on Wednesday night. Pt states has swelling in the left wrist. Pt states he has a history of a soft tissue infection.

## 2023-12-25 NOTE — ED PROVIDER NOTES
ED Provider Note    Primary care provider: Reginald East M.D.    CHIEF COMPLAINT  Chief Complaint   Patient presents with    Wrist Pain     Left wrist pain since Thursday, seen at [facility] on Saturday, no fever, not improving with steroids       External record review: Patient was seen at the [facility] express 48 hours ago by Dr. Lopez.  He was diagnosed with probable tendinopathy or arthritic flare, given a Medrol Dosepak and a cock-up splint, told to follow-up with hand.    HPI  Joni Stone is a 58 y.o. male who presents to the Emergency Department continued left wrist pain.  The patient 4 days ago had a freezer in the garage broke, as result he had a loaded with meat fillet rancid, he had to lift 4 bags full of rotten meat, some of which were heavy and threw him into his truck.  He did not notice any pain then but the next day when he woke up he had pain and swelling of the left wrist.  He states it feels very similar to when he had infection in the right hand and wrist a few years ago, the only difference is he does not have any redness at this time.  He notes pain worse with movement.  He has been taking daily NSAIDs, he went to the [facility] express 48 hours ago and was placed on a Medrol Dosepak.  He has not noted any improvement in range of motion and is concerned that there perhaps may be an infection.  He has had some improvement in the pain.    He has been tested for gout previously and has been negative.  Does have a history of diabetes.  Denies any polyarthralgias.  Is right-hand dominant.  Has follow-up with a hand surgeon on 6 January.    REVIEW OF SYSTEMS  See HPI.     PAST MEDICAL HISTORY   has a past medical history of Arthritis, Diabetes (HCC), High cholesterol, and Hypertension.    SURGICAL HISTORY   has a past surgical history that includes open reduction (Right, 2007); tonsillectomy; and colonoscopy,diagnostic (N/A, 10/31/2023).    SOCIAL HISTORY  Social History     Tobacco Use    Smoking status:  Never    Smokeless tobacco: Former     Types: Snuff     Quit date: 2013   Vaping Use    Vaping Use: Never used   Substance Use Topics    Alcohol use: Yes     Alcohol/week: 3.5 oz     Types: 7 Cans of beer per week     Comment: 7 per week    Drug use: No      Social History     Substance and Sexual Activity   Drug Use No       FAMILY HISTORY  Family History   Problem Relation Age of Onset    Hypertension Mother     Cancer Father        CURRENT MEDICATIONS  Reviewed.  See Encounter Summary.     ALLERGIES  No Known Allergies    PHYSICAL EXAM  VITAL SIGNS: BP (!) 194/103   Pulse (!) 119   Temp 36.8 °C (98.2 °F) (Temporal)   Resp 14   Ht 1.829 m (6')   Wt (!) 168 kg (369 lb 14.9 oz)   SpO2 93%   BMI 50.17 kg/m²   Constitutional: Awake, alert in no apparent distress.  HENT: Normocephalic, Bilateral external ears normal. Nose normal.   Eyes: Conjunctiva normal, non-icteric, EOMI.    Thorax & Lungs: Easy unlabored respirations  Cardiovascular: Tachycardic  Abdomen:  No distention  Skin: Visualized skin is  Dry, No erythema, No rash.   Extremities:   Left upper extremity: There is swelling throughout the hand and wrist.  Patient cannot supinate or pronate secondary to pain in the wrist.  He can slightly perform radial and ulnar deviation of the wrist, he can flex the wrist about 20 degrees, extension of the wrist is difficult.    Full painless range of motion of all of the fingers including thumb.  No snuffbox tenderness.  No fluctuance noted.  Neurologic: Alert, Grossly non-focal.   Psychiatric: Affect and Mood normal      Point of Care Ultrasound    The image below is taken on the dorsal aspect of the wrist, the yellow line intersects the radius, there is a slight amount of fluid dorsally to the radius, when compared to the contralateral joint there is a slight increase but overall less than 2 mm.            This study is a limited ultrasound examination performed and interpreted to evaluate for limited conditions  as outlined above. There may be other clinically important information contained in the images that is outside this scope. When clinically warranted, a comprehensive ultrasound through the appropriate department is considered.     RADIOLOGY  I have independently interpreted the diagnostic imaging associated with this visit and am waiting the final reading from the radiologist.   My preliminary interpretation is as follows: No acute fracture, no obvious effusion.    Radiologist interpretation:   DX-WRIST-COMPLETE 3+ LEFT   Final Result         1. Severe joint space loss with chondrocalcinosis in the proximal wrist, consistent with CPPD arthropathy.   2. Mild DJD at the first MCP and first IP joints.          COURSE & MEDICAL DECISION MAKING  Pertinent Labs & Imaging studies reviewed. (See chart for details)    Differential diagnoses include but are not limited to: Inflammatory arthritis, tenosynovitis, gouty arthritis, less likely septic joint    8:54 AM - Nursing notes reviewed, patient seen and examined at bedside.      Escalation of care considered, and ultimately not performed: blood analysis.    Decision tools and prescription drugs considered including, but not limited to: No Kanavel's signs.    Decision Making:  This is a pleasant 58 y.o. year old male who presents with pain and swelling of the left wrist.  It occurred insidiously the morning after some heavy repetitive motion with the left wrist.  He has had some slight improvement in the pain since being started on the Medrol Dosepak but is concerned that he has not really had any improvement in the swelling or range of motion.  He does have very poor supination and pronation but can perform some flexion.  The overlying skin is cool to the touch, there is no erythema.  He has good range of motion of all the fingers passing to the joint.  There is no sign of flexor tenosynovitis now.    I considered the possibility of a septic wrist, this is not a common  joint to get septic.  I performed an ultrasound, there is the slightest increase in edema on the left compared to the right but certainly no large areas of fluid and there is minimal fluid outside of the radius, would not be amenable to ERP aspiration at this time.    I think is a little premature to call at failure of steroid treatment, total duration of therapy has been fewer than 48 hours.  Would continue on the steroid course.  X-ray shows severe loss of joint space which is common in pseudogout.  This would certainly explain the patient's inflammatory condition today.  I will add 5 mg of Naprosyn twice daily with GI prophylaxis.    If he notes any worsening swelling, any redness, temperatures of 100.4, worsening range of motion then I would not hesitate to return back here for repeat evaluation.          \The patient was discharged (see d/c instructions) was told to return immediately for any signs or symptoms listed, or any worsening at all.  The patient verbally agreed to the discharge precautions and follow-up plan which is documented in EPIC.    Discharge Medications:  New Prescriptions    FAMOTIDINE (PEPCID) 40 MG TAB    Take 1 Tablet by mouth every day.    NAPROXEN (NAPROSYN) 500 MG TAB    Take 1 Tablet by mouth 2 times a day with meals.       FINAL IMPRESSION  1. Inflammatory arthritis

## 2023-12-25 NOTE — ED TRIAGE NOTES
Pt ambulates back to room  Chief Complaint   Patient presents with    Wrist Pain     Left wrist pain since Thursday, seen at [facility] on Saturday, no fever, not improving with steroids     Pt resting on gurney, pt in no acute distress, pt provided call light, instructed to call if needing any assistance, instructed not to get up by self, gurney in lowest position.

## 2023-12-25 NOTE — ED NOTES
Assumed care of pt-holding left wrist with right hand. Denies need for ice-pillow placed on bedside table and pt assisted to place wrist on same for elevation and support. Pt aware of pending xray

## 2023-12-25 NOTE — ED NOTES
Meds given for 4/10 left wrist pain. Pt aware of need to  same at [facility] tomorrow and take as directed, f/u with pcp and [facility], ice prn, return for worsening s/s

## 2024-06-25 ENCOUNTER — HOSPITAL ENCOUNTER (OUTPATIENT)
Dept: LAB | Facility: MEDICAL CENTER | Age: 59
End: 2024-06-25
Attending: FAMILY MEDICINE
Payer: COMMERCIAL

## 2024-06-25 LAB
EST. AVERAGE GLUCOSE BLD GHB EST-MCNC: 143 MG/DL
HBA1C MFR BLD: 6.6 % (ref 4–5.6)

## 2024-06-25 PROCEDURE — 36415 COLL VENOUS BLD VENIPUNCTURE: CPT

## 2024-06-25 PROCEDURE — 83036 HEMOGLOBIN GLYCOSYLATED A1C: CPT

## 2025-01-04 ENCOUNTER — HOSPITAL ENCOUNTER (OUTPATIENT)
Dept: LAB | Facility: MEDICAL CENTER | Age: 60
End: 2025-01-04
Attending: FAMILY MEDICINE
Payer: COMMERCIAL

## 2025-01-04 LAB
ALBUMIN SERPL BCP-MCNC: 4 G/DL (ref 3.2–4.9)
ALBUMIN/GLOB SERPL: 1.1 G/DL
ALP SERPL-CCNC: 39 U/L (ref 30–99)
ALT SERPL-CCNC: 33 U/L (ref 2–50)
ANION GAP SERPL CALC-SCNC: 7 MMOL/L (ref 7–16)
AST SERPL-CCNC: 22 U/L (ref 12–45)
BILIRUB SERPL-MCNC: 0.4 MG/DL (ref 0.1–1.5)
BUN SERPL-MCNC: 18 MG/DL (ref 8–22)
CALCIUM ALBUM COR SERPL-MCNC: 9.6 MG/DL (ref 8.5–10.5)
CALCIUM SERPL-MCNC: 9.6 MG/DL (ref 8.5–10.5)
CHLORIDE SERPL-SCNC: 101 MMOL/L (ref 96–112)
CHOLEST SERPL-MCNC: 120 MG/DL (ref 100–199)
CO2 SERPL-SCNC: 31 MMOL/L (ref 20–33)
CREAT SERPL-MCNC: 0.66 MG/DL (ref 0.5–1.4)
CREAT UR-MCNC: 83.06 MG/DL
EST. AVERAGE GLUCOSE BLD GHB EST-MCNC: 143 MG/DL
GFR SERPLBLD CREATININE-BSD FMLA CKD-EPI: 107 ML/MIN/1.73 M 2
GLOBULIN SER CALC-MCNC: 3.5 G/DL (ref 1.9–3.5)
GLUCOSE SERPL-MCNC: 113 MG/DL (ref 65–99)
HBA1C MFR BLD: 6.6 % (ref 4–5.6)
HDLC SERPL-MCNC: 41 MG/DL
LDLC SERPL CALC-MCNC: 60 MG/DL
MICROALBUMIN UR-MCNC: <1.2 MG/DL
MICROALBUMIN/CREAT UR: NORMAL MG/G (ref 0–30)
POTASSIUM SERPL-SCNC: 5.1 MMOL/L (ref 3.6–5.5)
PROT SERPL-MCNC: 7.5 G/DL (ref 6–8.2)
PSA SERPL DL<=0.01 NG/ML-MCNC: 0.4 NG/ML (ref 0–4)
SODIUM SERPL-SCNC: 139 MMOL/L (ref 135–145)
TRIGL SERPL-MCNC: 94 MG/DL (ref 0–149)

## 2025-01-04 PROCEDURE — 84153 ASSAY OF PSA TOTAL: CPT

## 2025-01-04 PROCEDURE — 82570 ASSAY OF URINE CREATININE: CPT

## 2025-01-04 PROCEDURE — 80061 LIPID PANEL: CPT

## 2025-01-04 PROCEDURE — 80053 COMPREHEN METABOLIC PANEL: CPT

## 2025-01-04 PROCEDURE — 82043 UR ALBUMIN QUANTITATIVE: CPT

## 2025-01-04 PROCEDURE — 36415 COLL VENOUS BLD VENIPUNCTURE: CPT

## 2025-01-04 PROCEDURE — 83036 HEMOGLOBIN GLYCOSYLATED A1C: CPT

## 2025-06-05 ENCOUNTER — HOSPITAL ENCOUNTER (OUTPATIENT)
Dept: LAB | Facility: MEDICAL CENTER | Age: 60
End: 2025-06-05
Attending: FAMILY MEDICINE
Payer: COMMERCIAL

## 2025-06-05 LAB
EST. AVERAGE GLUCOSE BLD GHB EST-MCNC: 114 MG/DL
HBA1C MFR BLD: 5.6 % (ref 4–5.6)

## 2025-06-05 PROCEDURE — 83036 HEMOGLOBIN GLYCOSYLATED A1C: CPT

## 2025-06-05 PROCEDURE — 36415 COLL VENOUS BLD VENIPUNCTURE: CPT

## (undated) DEVICE — SET EXTENSION WITH 2 PORTS (48EA/CA) ***PART #2C8610 IS A SUBSTITUTE*****

## (undated) DEVICE — SPONGE GAUZE NON-STERILE 4X4 - (2000/CA 10PK/CA)

## (undated) DEVICE — GOWN WARMING STANDARD FLEX - (30/CA)

## (undated) DEVICE — TUBING CLEARLINK DUO-VENT - C-FLO (48EA/CA)

## (undated) DEVICE — GOWN SURGEONS LARGE - (32/CA)

## (undated) DEVICE — WATER IRRIGATION STERILE 1000ML (12EA/CA)

## (undated) DEVICE — CATHETER IV SAFETY 20 GA X 1-1/4 (50/BX)

## (undated) DEVICE — FORCEP RADIAL JAW 4 STANDARD CAPACITY W/NEEDLE 240CM (40EA/BX)

## (undated) DEVICE — KIT  I.V. START (100EA/CA)

## (undated) DEVICE — KIT CUSTOM PROCEDURE SINGLE FOR ENDO  (15/CA)

## (undated) DEVICE — SYRINGE SAFETY 5 ML 18 GA X 1-1/2 BLUNT LL (100/BX 4BX/CA)

## (undated) DEVICE — TUBE CONNECTING SUCTION - CLEAR PLASTIC STERILE 72 IN (50EA/CA)

## (undated) DEVICE — SYRINGE SAFETY 10 ML 18 GA X 1 1/2 BLUNT LL (100/BX 4BX/CA)

## (undated) DEVICE — SENSOR OXIMETER ADULT SPO2 RD SET (20EA/BX)

## (undated) DEVICE — PAD PREP 24 X 48 CUFFED - (100/CA)

## (undated) DEVICE — CANISTER SUCTION RIGID RED 1500CC (40EA/CA)

## (undated) DEVICE — SYRINGE SAFETY 3 ML 18 GA X 1 1/2 BLUNT LL (100/BX 8BX/CA)

## (undated) DEVICE — SYRINGE DISP. 60 CC LL - (30/BX, 12BX/CA)**WHEN THESE ARE GONE ORDER #500206**

## (undated) DEVICE — LACTATED RINGERS INJ 1000 ML - (14EA/CA 60CA/PF)